# Patient Record
Sex: FEMALE | Race: WHITE | NOT HISPANIC OR LATINO | Employment: UNEMPLOYED | URBAN - METROPOLITAN AREA
[De-identification: names, ages, dates, MRNs, and addresses within clinical notes are randomized per-mention and may not be internally consistent; named-entity substitution may affect disease eponyms.]

---

## 2019-10-09 ENCOUNTER — HOSPITAL ENCOUNTER (OUTPATIENT)
Dept: URGENT CARE | Facility: CLINIC | Age: 19
Discharge: HOME | End: 2019-10-09
Attending: FAMILY MEDICINE

## 2019-10-17 ENCOUNTER — OFFICE VISIT CONVERTED (OUTPATIENT)
Dept: ORTHOPEDIC SURGERY | Facility: CLINIC | Age: 19
End: 2019-10-17
Attending: ORTHOPAEDIC SURGERY

## 2020-10-14 ENCOUNTER — HOSPITAL ENCOUNTER (OUTPATIENT)
Dept: OTHER | Facility: HOSPITAL | Age: 20
Discharge: HOME | End: 2020-10-14
Attending: NURSE PRACTITIONER

## 2020-10-16 LAB
CONV MUMPS ANTIBODY IGG: 34.6 AU/ML
HBV SURFACE AB SER QL: NON REACTIVE
MEV IGG SER IA-ACNC: 88.8 AU/ML
RUBV IGG SER-ACNC: 173.9 [IU]/ML
VZV IGG SER IA-ACNC: 271 INDEX

## 2021-02-04 ENCOUNTER — OFFICE VISIT CONVERTED (OUTPATIENT)
Dept: GASTROENTEROLOGY | Facility: HOSPITAL | Age: 21
End: 2021-02-04
Attending: FAMILY MEDICINE

## 2021-02-08 ENCOUNTER — HOSPITAL ENCOUNTER (OUTPATIENT)
Dept: CARDIOLOGY | Facility: HOSPITAL | Age: 21
Discharge: HOME | End: 2021-02-08
Attending: SURGERY

## 2021-02-11 ENCOUNTER — HOSPITAL ENCOUNTER (OUTPATIENT)
Dept: PREADMISSION TESTING | Facility: HOSPITAL | Age: 21
Discharge: HOME | End: 2021-02-11
Attending: SURGERY

## 2021-02-11 LAB
ANION GAP SERPL CALC-SCNC: 11 MMOL/L (ref 8–19)
APTT BLD: 20.9 S (ref 22.2–34.2)
BASOPHILS # BLD AUTO: 0.01 10*3/UL (ref 0–0.2)
BASOPHILS NFR BLD AUTO: 0.2 % (ref 0–3)
BUN SERPL-MCNC: 12 MG/DL (ref 5–25)
BUN/CREAT SERPL: 29 {RATIO} (ref 6–20)
CALCIUM SERPL-MCNC: 8.4 MG/DL (ref 8.7–10.4)
CHLORIDE SERPL-SCNC: 104 MMOL/L (ref 99–111)
CONV ABS IMM GRAN: 0.09 10*3/UL (ref 0–0.2)
CONV CO2: 24 MMOL/L (ref 22–32)
CONV IMMATURE GRAN: 1.4 % (ref 0–1.8)
CREAT UR-MCNC: 0.41 MG/DL (ref 0.5–0.9)
DEPRECATED RDW RBC AUTO: 55.4 FL (ref 36.4–46.3)
EOSINOPHIL # BLD AUTO: 0.02 10*3/UL (ref 0–0.7)
EOSINOPHIL # BLD AUTO: 0.3 % (ref 0–7)
ERYTHROCYTE [DISTWIDTH] IN BLOOD BY AUTOMATED COUNT: 16.8 % (ref 11.7–14.4)
GFR SERPLBLD BASED ON 1.73 SQ M-ARVRAT: >60 ML/MIN/{1.73_M2}
GLUCOSE SERPL-MCNC: 108 MG/DL (ref 65–99)
HCG UR QL: NEGATIVE
HCT VFR BLD AUTO: 35.4 % (ref 37–47)
HGB BLD-MCNC: 10.8 G/DL (ref 12–16)
INR PPP: 0.86 (ref 2–3)
LYMPHOCYTES # BLD AUTO: 0.48 10*3/UL (ref 1–5)
LYMPHOCYTES NFR BLD AUTO: 7.3 % (ref 20–45)
MCH RBC QN AUTO: 27.6 PG (ref 27–31)
MCHC RBC AUTO-ENTMCNC: 30.5 G/DL (ref 33–37)
MCV RBC AUTO: 90.3 FL (ref 81–99)
MONOCYTES # BLD AUTO: 0.15 10*3/UL (ref 0.2–1.2)
MONOCYTES NFR BLD AUTO: 2.3 % (ref 3–10)
NEUTROPHILS # BLD AUTO: 5.85 10*3/UL (ref 2–8)
NEUTROPHILS NFR BLD AUTO: 88.5 % (ref 30–85)
NRBC CBCN: 0 % (ref 0–0.7)
OSMOLALITY SERPL CALC.SUM OF ELEC: 280 MOSM/KG (ref 273–304)
PLATELET # BLD AUTO: 275 10*3/UL (ref 130–400)
PMV BLD AUTO: 10.1 FL (ref 9.4–12.3)
POTASSIUM SERPL-SCNC: 4.4 MMOL/L (ref 3.5–5.3)
PROTHROMBIN TIME: 9.7 S (ref 9.4–12)
RBC # BLD AUTO: 3.92 10*6/UL (ref 4.2–5.4)
SODIUM SERPL-SCNC: 135 MMOL/L (ref 135–147)
WBC # BLD AUTO: 6.6 10*3/UL (ref 4.8–10.8)

## 2021-02-12 LAB — SARS-COV-2 RNA SPEC QL NAA+PROBE: NOT DETECTED

## 2021-02-22 ENCOUNTER — HOSPITAL ENCOUNTER (OUTPATIENT)
Dept: OTHER | Facility: HOSPITAL | Age: 21
Discharge: HOME | End: 2021-02-22

## 2021-02-22 LAB
25(OH)D3 SERPL-MCNC: 32 NG/ML (ref 30–100)
ALBUMIN SERPL-MCNC: 3.2 G/DL (ref 3.5–5)
ALBUMIN/GLOB SERPL: 1 {RATIO} (ref 1.4–2.6)
ALP SERPL-CCNC: 63 U/L (ref 42–98)
ALT SERPL-CCNC: 14 U/L (ref 10–40)
ANION GAP SERPL CALC-SCNC: 13 MMOL/L (ref 8–19)
AST SERPL-CCNC: 14 U/L (ref 15–50)
BASOPHILS # BLD AUTO: 0.03 10*3/UL (ref 0–0.2)
BASOPHILS NFR BLD AUTO: 0.6 % (ref 0–3)
BILIRUB SERPL-MCNC: <0.15 MG/DL (ref 0.2–1.3)
BUN SERPL-MCNC: 13 MG/DL (ref 5–25)
BUN/CREAT SERPL: 26 {RATIO} (ref 6–20)
CALCIUM SERPL-MCNC: 9.4 MG/DL (ref 8.7–10.4)
CHLORIDE SERPL-SCNC: 107 MMOL/L (ref 99–111)
CONV ABS IMM GRAN: 0.06 10*3/UL (ref 0–0.2)
CONV CO2: 26 MMOL/L (ref 22–32)
CONV IMMATURE GRAN: 1.3 % (ref 0–1.8)
CONV TOTAL PROTEIN: 6.5 G/DL (ref 6.3–8.2)
CREAT UR-MCNC: 0.5 MG/DL (ref 0.5–0.9)
DEPRECATED RDW RBC AUTO: 51.6 FL (ref 36.4–46.3)
EOSINOPHIL # BLD AUTO: 0.07 10*3/UL (ref 0–0.7)
EOSINOPHIL # BLD AUTO: 1.5 % (ref 0–7)
ERYTHROCYTE [DISTWIDTH] IN BLOOD BY AUTOMATED COUNT: 15.8 % (ref 11.7–14.4)
GFR SERPLBLD BASED ON 1.73 SQ M-ARVRAT: >60 ML/MIN/{1.73_M2}
GLOBULIN UR ELPH-MCNC: 3.3 G/DL (ref 2–3.5)
GLUCOSE SERPL-MCNC: 71 MG/DL (ref 65–99)
HCT VFR BLD AUTO: 34.5 % (ref 37–47)
HGB BLD-MCNC: 10.4 G/DL (ref 12–16)
LYMPHOCYTES # BLD AUTO: 1.59 10*3/UL (ref 1–5)
LYMPHOCYTES NFR BLD AUTO: 34.3 % (ref 20–45)
MCH RBC QN AUTO: 26.7 PG (ref 27–31)
MCHC RBC AUTO-ENTMCNC: 30.1 G/DL (ref 33–37)
MCV RBC AUTO: 88.7 FL (ref 81–99)
MONOCYTES # BLD AUTO: 0.45 10*3/UL (ref 0.2–1.2)
MONOCYTES NFR BLD AUTO: 9.7 % (ref 3–10)
NEUTROPHILS # BLD AUTO: 2.43 10*3/UL (ref 2–8)
NEUTROPHILS NFR BLD AUTO: 52.6 % (ref 30–85)
NRBC CBCN: 0 % (ref 0–0.7)
OSMOLALITY SERPL CALC.SUM OF ELEC: 293 MOSM/KG (ref 273–304)
PLATELET # BLD AUTO: 326 10*3/UL (ref 130–400)
PMV BLD AUTO: 10.7 FL (ref 9.4–12.3)
POTASSIUM SERPL-SCNC: 4 MMOL/L (ref 3.5–5.3)
RBC # BLD AUTO: 3.89 10*6/UL (ref 4.2–5.4)
SODIUM SERPL-SCNC: 142 MMOL/L (ref 135–147)
WBC # BLD AUTO: 4.63 10*3/UL (ref 4.8–10.8)

## 2021-03-08 ENCOUNTER — HOSPITAL ENCOUNTER (OUTPATIENT)
Dept: CARDIOLOGY | Facility: HOSPITAL | Age: 21
Discharge: HOME | End: 2021-03-08
Attending: SURGERY

## 2021-03-15 ENCOUNTER — HOSPITAL ENCOUNTER (OUTPATIENT)
Dept: CARDIOLOGY | Facility: HOSPITAL | Age: 21
Discharge: HOME | End: 2021-03-15
Attending: FAMILY MEDICINE

## 2021-03-18 ENCOUNTER — HOSPITAL ENCOUNTER (OUTPATIENT)
Dept: PREADMISSION TESTING | Facility: HOSPITAL | Age: 21
Discharge: HOME | End: 2021-03-18
Attending: SURGERY

## 2021-03-22 LAB — SARS-COV-2 RNA SPEC QL NAA+PROBE: NOT DETECTED

## 2021-03-23 ENCOUNTER — HOSPITAL ENCOUNTER (OUTPATIENT)
Dept: PERIOP | Facility: HOSPITAL | Age: 21
Setting detail: HOSPITAL OUTPATIENT SURGERY
Discharge: HOME | End: 2021-03-24
Attending: SURGERY

## 2021-03-23 LAB
ANION GAP SERPL CALC-SCNC: 13 MMOL/L (ref 8–19)
APTT BLD: 20.8 S (ref 22.2–34.2)
BASOPHILS # BLD AUTO: 0.01 10*3/UL (ref 0–0.2)
BASOPHILS NFR BLD AUTO: 0.2 % (ref 0–3)
BUN SERPL-MCNC: 12 MG/DL (ref 5–25)
BUN/CREAT SERPL: 23 {RATIO} (ref 6–20)
CALCIUM SERPL-MCNC: 9 MG/DL (ref 8.7–10.4)
CHLORIDE SERPL-SCNC: 108 MMOL/L (ref 99–111)
CONV ABS IMM GRAN: 0.05 10*3/UL (ref 0–0.2)
CONV CO2: 21 MMOL/L (ref 22–32)
CONV IMMATURE GRAN: 1.2 % (ref 0–1.8)
CREAT UR-MCNC: 0.53 MG/DL (ref 0.5–0.9)
DEPRECATED RDW RBC AUTO: 49.1 FL (ref 36.4–46.3)
EOSINOPHIL # BLD AUTO: 0.15 10*3/UL (ref 0–0.7)
EOSINOPHIL # BLD AUTO: 3.7 % (ref 0–7)
ERYTHROCYTE [DISTWIDTH] IN BLOOD BY AUTOMATED COUNT: 15.4 % (ref 11.7–14.4)
GFR SERPLBLD BASED ON 1.73 SQ M-ARVRAT: >60 ML/MIN/{1.73_M2}
GLUCOSE BLD-MCNC: 122 MG/DL (ref 65–99)
GLUCOSE BLD-MCNC: 143 MG/DL (ref 65–99)
GLUCOSE SERPL-MCNC: 73 MG/DL (ref 65–99)
HCG UR QL: NEGATIVE
HCT VFR BLD AUTO: 38.6 % (ref 37–47)
HGB BLD-MCNC: 11.5 G/DL (ref 12–16)
INR PPP: 0.9 (ref 2–3)
LYMPHOCYTES # BLD AUTO: 1.02 10*3/UL (ref 1–5)
LYMPHOCYTES NFR BLD AUTO: 24.9 % (ref 20–45)
MCH RBC QN AUTO: 25.8 PG (ref 27–31)
MCHC RBC AUTO-ENTMCNC: 29.8 G/DL (ref 33–37)
MCV RBC AUTO: 86.5 FL (ref 81–99)
MONOCYTES # BLD AUTO: 0.37 10*3/UL (ref 0.2–1.2)
MONOCYTES NFR BLD AUTO: 9 % (ref 3–10)
NEUTROPHILS # BLD AUTO: 2.5 10*3/UL (ref 2–8)
NEUTROPHILS NFR BLD AUTO: 61 % (ref 30–85)
NRBC CBCN: 0 % (ref 0–0.7)
OSMOLALITY SERPL CALC.SUM OF ELEC: 284 MOSM/KG (ref 273–304)
PLATELET # BLD AUTO: 235 10*3/UL (ref 130–400)
PMV BLD AUTO: 10.8 FL (ref 9.4–12.3)
POTASSIUM SERPL-SCNC: 4.4 MMOL/L (ref 3.5–5.3)
PROTHROMBIN TIME: 10 S (ref 9.4–12)
RBC # BLD AUTO: 4.46 10*6/UL (ref 4.2–5.4)
SODIUM SERPL-SCNC: 138 MMOL/L (ref 135–147)
WBC # BLD AUTO: 4.1 10*3/UL (ref 4.8–10.8)

## 2021-03-24 LAB
ANION GAP SERPL CALC-SCNC: 14 MMOL/L (ref 8–19)
BASOPHILS # BLD AUTO: 0.01 10*3/UL (ref 0–0.2)
BASOPHILS NFR BLD AUTO: 0.1 % (ref 0–3)
BUN SERPL-MCNC: 11 MG/DL (ref 5–25)
BUN/CREAT SERPL: 20 {RATIO} (ref 6–20)
CALCIUM SERPL-MCNC: 8.6 MG/DL (ref 8.7–10.4)
CHLORIDE SERPL-SCNC: 106 MMOL/L (ref 99–111)
CONV ABS IMM GRAN: 0.04 10*3/UL (ref 0–0.2)
CONV CO2: 23 MMOL/L (ref 22–32)
CONV IMMATURE GRAN: 0.3 % (ref 0–1.8)
CREAT UR-MCNC: 0.54 MG/DL (ref 0.5–0.9)
DEPRECATED RDW RBC AUTO: 46.9 FL (ref 36.4–46.3)
EOSINOPHIL # BLD AUTO: 0.06 10*3/UL (ref 0–0.7)
EOSINOPHIL # BLD AUTO: 0.5 % (ref 0–7)
ERYTHROCYTE [DISTWIDTH] IN BLOOD BY AUTOMATED COUNT: 15.3 % (ref 11.7–14.4)
GFR SERPLBLD BASED ON 1.73 SQ M-ARVRAT: >60 ML/MIN/{1.73_M2}
GLUCOSE BLD-MCNC: 84 MG/DL (ref 65–99)
GLUCOSE SERPL-MCNC: 108 MG/DL (ref 65–99)
HCT VFR BLD AUTO: 34.6 % (ref 37–47)
HGB BLD-MCNC: 10.6 G/DL (ref 12–16)
LYMPHOCYTES # BLD AUTO: 0.91 10*3/UL (ref 1–5)
LYMPHOCYTES NFR BLD AUTO: 7.8 % (ref 20–45)
MCH RBC QN AUTO: 25.9 PG (ref 27–31)
MCHC RBC AUTO-ENTMCNC: 30.6 G/DL (ref 33–37)
MCV RBC AUTO: 84.4 FL (ref 81–99)
MONOCYTES # BLD AUTO: 0.27 10*3/UL (ref 0.2–1.2)
MONOCYTES NFR BLD AUTO: 2.3 % (ref 3–10)
NEUTROPHILS # BLD AUTO: 10.41 10*3/UL (ref 2–8)
NEUTROPHILS NFR BLD AUTO: 89 % (ref 30–85)
NRBC CBCN: 0 % (ref 0–0.7)
OSMOLALITY SERPL CALC.SUM OF ELEC: 288 MOSM/KG (ref 273–304)
PLATELET # BLD AUTO: 255 10*3/UL (ref 130–400)
PMV BLD AUTO: 11.6 FL (ref 9.4–12.3)
POTASSIUM SERPL-SCNC: 3.8 MMOL/L (ref 3.5–5.3)
RBC # BLD AUTO: 4.1 10*6/UL (ref 4.2–5.4)
SODIUM SERPL-SCNC: 139 MMOL/L (ref 135–147)
WBC # BLD AUTO: 11.7 10*3/UL (ref 4.8–10.8)

## 2021-04-26 ENCOUNTER — HOSPITAL ENCOUNTER (OUTPATIENT)
Dept: CARDIOLOGY | Facility: HOSPITAL | Age: 21
Discharge: HOME | End: 2021-04-26
Attending: SURGERY

## 2021-05-03 ENCOUNTER — HOSPITAL ENCOUNTER (OUTPATIENT)
Dept: VACCINE CLINIC | Facility: HOSPITAL | Age: 21
Discharge: HOME | End: 2021-05-03
Attending: INTERNAL MEDICINE

## 2021-05-10 ENCOUNTER — HOSPITAL ENCOUNTER (OUTPATIENT)
Dept: CARDIOLOGY | Facility: HOSPITAL | Age: 21
Discharge: HOME | End: 2021-05-10
Attending: SURGERY

## 2021-05-15 VITALS — HEART RATE: 89 BPM | WEIGHT: 197 LBS | OXYGEN SATURATION: 98 % | HEIGHT: 63 IN | BODY MASS INDEX: 34.91 KG/M2

## 2021-05-25 ENCOUNTER — HOSPITAL ENCOUNTER (OUTPATIENT)
Dept: VACCINE CLINIC | Facility: HOSPITAL | Age: 21
Discharge: HOME | End: 2021-05-25
Attending: INTERNAL MEDICINE

## 2021-05-28 VITALS
OXYGEN SATURATION: 100 % | WEIGHT: 147.93 LBS | HEART RATE: 116 BPM | BODY MASS INDEX: 26.21 KG/M2 | HEIGHT: 63 IN | RESPIRATION RATE: 16 BRPM | TEMPERATURE: 97.6 F | DIASTOLIC BLOOD PRESSURE: 60 MMHG | SYSTOLIC BLOOD PRESSURE: 150 MMHG

## 2021-05-28 NOTE — PROGRESS NOTES
"Patient: MARY BAUTISTA     Acct: [id number]     Report: #[id number]  UNIT #: [id number]     : 2000    Encounter Date:2021  PRIMARY CARE: CHRISTOPHER ANNE  ***Signed***  --------------------------------------------------------------------------------------------------------------------  Chief Complaint      Encounter Date      2021            Primary Care Provider      CHRISTOPHER ANNE            Referring Provider            [facility]            Patient Complaint      Patient is complaining of      Pt here for  2 week follow up/ Acute hypoxic respiratory failure requiring     intubation and mechanical ventilation      Assessment from Discharge note:      \"ARDS      Pneumomediastinum      COVID-19 ruled out      Bilateral apical pneumothoraces      Community-acquired pneumonia, unknown organism      Septic shock due to above (Hypotension, Tachycardia, Leukocytosis, Tachypnea,     Source)      Raynaud's      Lactic acidosis      Acute Renal Failure      Previous tobacco abuse      Hypomagnesemia      Bilateral lower extremity ischemic changes- bilateral toe necrosis       Hypokalemia      Concern for toxic shock syndrome\"            VITALS      Height 5 ft 3 in / 160.02 cm      Weight 147 lbs 14.859 oz / 67.1 kg      BSA 1.70 m2      BMI 26.2 kg/m2      Temperature 97.6 F / 36.44 C - Oral      Pulse 116      Respirations 16      Blood Pressure 150/60 Sitting, Right Arm      Pulse Oximetry 100%, room air            HPI      Follow up at [facility] for post hospital discharge follow up.            20 year old female ex-smoker with no past medical history up until her     hospitalization from 2021 to 2021 for Acute hypoxic respiratory     failure requiring intubation and mechanical ventilation, ARDS,     Pneumomediastinum, Bilateral apical pneumothoraces, Community-acquired     pneumonia, unknown organism, Septic shock, Raynaud's, renal failure, Bilateral     lower " extremity ischemic changes- bilateral toe necrosis. She was ruled out for     COVID19. There was concern for EVALI with her vaping history, vs CTD such as     scleroderma, or possibly CHD or PVOD. She was discharged home in stable     condition.             Post discharge she has not required NC O2.            Complains of weakness, requiring a wheelchair and walker to mobilize.             Accompanied by her mother; mother concerned about discoloration of her right     lower leg; open ulcer area on the medial aspect by her heel and her toes have     become black and gangrenous, there are new areas of superficial necrosis atop     her foot. She is applying bacitracin to the ulcer area and topical nitropaste to    the gangrenous region. She complains of pain and discomfort, there is no warmth     or redness. She has a vascular appointment but not for several weeks. She asks     we help with her moving this appointment up.       Continues to have right sided chest pain, tolerable. She continues to have cough    intermittently, she has no dysphagia symptoms. She has been using an albuterol     inhaler twice a day. She was not sure to use if as needed.            She did follow up with Rheumatology post discharge on 1/27/2021; High titer     positive RICHARD, gangrene of feet, raynaud's disease with gangrene, she will follow    up with vascular surgery Dr. Adams and continue taking Prednisone 20 mg daily,     start azathioprine 50 mg daily, follow up in 1 month. She has had a superficial     rash develop on her chest, arms and back, she states it does not bother her and     can not determine if it coinsides with starting Azathioprine. Denies pruritis.             She denied any new fevers, chills, sweats, headache, dizziness, lightheadedness,    chest pain, palpitations, abdominal pain, nausea, vomiting, diarrhea.            ROS      Constitutional:  Denies: Fatigue, Fever, Weight gain, Weight loss, Chills,     Insomnia,  Other      Respiratory/Breathing:  Complains of: Cough; Denies: Shortness of air, Wheezing,    Hemoptysis, Pleuritic pain, Other      Endocrine:  Denies: Polydipsia, Polyuria, Heat/cold intolerance, Abnorml     menstrual pattern, Diabetes, Other      Eyes:  Denies: Blurred vision, Vision Changes, Other      Ears, nose, mouth, throat:  Complains of: Allergies/Hay Fever, Dry Mouth, Nasal     congestion; Denies: Mouth lesions, Thrush, Throat pain, Hoarseness, Post Nasal     Drip, Headaches, Recent Head Injury, Nose Bleeding, Neck Stiffness, Thyroid     Mass, Hearing Loss, Ear Fullness, Nasal or Sinus Pain, Dry Lips, Nasal     discharge, Other      Cardiovascular:  Complains of: Chest Pain, Leg Swelling (jewell feet and ankles),     Irregular Heart Rate, Dyspnea on Exertion; Denies: Palpitations, Syncope,     Claudication, Wake up Gasping for air, Cyanosis, Other      Gastrointestinal:  Complains of: Reflux/Heartburn; Denies: Nausea, Constipation,    Diarrhea, Abdominal pain, Vomiting, Difficulty Swallowing, Dysphagia, Jaundice,     Bloating, Melena, Bloody stools, Other      Genitourinary:  Denies: Urinary frequency, Incontinence, Hematuria, Urgency,     Nocturia, Dysuria, Testicular problems, Other      Musculoskeletal:  Denies: Joint Pain, Joint Stiffness, Joint Swelling, Myalgias,    Other      Hematologic/lymphatic:  DENIES: Lymphadenopathy, Bruising, Bleeding tendencies,     Other      Neurological:  Complains of: Numbness (Biol Feet), Weakness; Denies: Headache,     Seizures, Other      Psychiatric:  Complains of: Anxiety, Depression; Denies: Appropriate Effect,     Other      Sleep:  No: Excessive daytime sleep, Morning Headache?, Snoring, Insomnia?, Stop    breathing at sleep?, Other      Integumentary:  Complains of: Rash; Denies: Dry skin, Skin Warm to Touch, Other      Immunologic/Allergic:  Complains of: Seasonal allergies; Denies: Latex allergy,     Asthma, Urticaria, Eczema, Other      Immunization status:   Up to date            FAMILY/SOCIAL/MEDICAL HX      Surgical History:  Yes: Oral Surgery (WISDOM TEETH REMOVAL)      Stroke - Family Hx:  Grandparent      Heart - Family Hx:  Grandparent      Diabetes - Family Hx:  Grandparent      Cancer/Type - Family Hx:  Grandparent      Other Family Medical History:  Grandparent      Is Father Still Living?:  Yes      Is Mother Still Living?:  Yes      Social History:  Tobacco Use; No Alcohol Use, No Recreational Drug use      Smoking status:  Current every day smoker (Pt started smoking at 17 years old, 1    pack per week/ quit Dec 2020)       Section:  No      Tubal Ligation:  No      Hysterectomy:  No      Anticoagulation Therapy:  No      Antibiotic Prophylaxis:  No      Medical History:  Yes: Allergies, Depression, Anxiety, Reflux Disease, Shortness    Of Breath, Sinus Trouble; No: Arthritis, Asthma, Blood Disease,     Chemotherapy/Cancer, Chronic Bronchitis/COPD, Congestive Heart Failu, Deafness     or Ringing Ears, Diabetes, Seizures, Heart Attack, Hemorrhoids/Rectal Prob, High    Blood Pressure, Miscellaneous Medical/oth      Psychiatric History      Depression/Anxiety            PREVENTION      Hx Influenza Vaccination:  Yes      Influenza Vaccine Declined:  No      2 or More Falls in Past Year?:  No      Fall Past Year with Injury?:  No      Hx Pneumococcal Vaccination:  No      Encouraged to follow-up with:  PCP regarding preventative exams.      Chart initiated by      Isabella Varner MA, Cindy Black MD            ALLERGIES/MEDICATIONS      Allergies:        Coded Allergies:             AMOXICILLIN (Verified  Allergy, Intermediate, HORRIBLE BODY RASH, 21)           PENICILLINS (Verified  Allergy, Unknown, 21)                  HORRIBLE BODY RASH      Medications      azaTHIOprine (Imuran) 50 Mg Tab      50 MG PO QDAY, TAB         Reported         21       predniSONE (predniSONE) 20 Mg Tablet      20 MG PO QDAY for 5 Days, #5 TAB 0 Refills          Prov: Brian Marti         1/26/21       hydrOXYzine HCL (Atarax) 25 Mg Tablet      25 MG PO HS PRN for SLEEP for 30 Days, #30 TAB         Prov: Brian Marti         1/26/21       Aspirin EC (Aspirin EC) 81 Mg Tablet.dr      81 MG PO QDAY for 30 Days, #30 TAB.SR         Prov: Brian Marti         1/26/21       Nitroglycerin (Nitro-Bid) 60 Gm Oint...g.      1 INCH TOPICAL Q6HR, #1 TUBE 1 Refill         Prov: Brian Marti         1/26/21       amLODIPine (amLODIPine) 5 Mg Tablet      5 MG PO BID for 30 Days, #60 TAB         Prov: Brian Marti         1/26/21       Ondansetron Odt (ONDANSETRON ODT) 4 Mg Tab.rapdis      4 MG PO Q6H PRN for NAUSEA         Reported         1/13/21       MDI-Albuterol (Ventolin HFA) 8 Gm Hfa.aer.ad      2 PUFFS INH Q4H PRN for SHORTNESS OF BREATH, #1 MDI 0 Refills         Reported         1/13/21      Current Medications      Current Medications Reviewed 2/4/21            EXAM      Vital Signs reviewed.      GENERAL: Awake, alert, cooperative, pleasant, sitting in room recliner chair      HEENT: EOMI, Oropharynx and nasal mucosa normal, no lesions orally      NECK: No adenopathy, FROM      CARDIOVASCULAR: Heart regular rate and rhythm, no murmurs detected.       LUNGS: Bilateral breath sounds are noted. No wheezing.      ABDOMEN: Symmetrical without distension. BS are present in all four quadrants.      EXTREMITIES: Peripheral pulses are positive. No edema is noted, dry gangrene of     toes on right foot, necrotic areas affecting bilateral toes and the plantar     aspect of the right foot. Superficial ulceration right foot medial aspect above     heel, no drainage or purulence.      NEUROLOGIC: Mental status and alertness is normal.      SKIN: Maculopapular rash blanching involving arms bilaterally, as well torso.      PSYCHIATRIC: Mood and affect appear normal      Vtials      Vitals:             Height 5 ft 3 in / 160.02 cm           Weight 147 lbs 14.859 oz / 67.1 kg           BSA 1.70  m2           BMI 26.2 kg/m2           Temperature 97.6 F / 36.44 C - Oral           Pulse 116           Respirations 16           Blood Pressure 150/60 Sitting, Right Arm           Pulse Oximetry 100%, room air            REVIEW      Results Reviewed      PCCS Results Reviewed?:  Yes Prev Lab Results, Yes Prev Radiology Results, Yes     Previous [facility] Records            Assessment      Notes      New Medications      * azaTHIOprine (Imuran) 50 MG TAB: 50 MG PO QDAY      Discontinued Medications      * Melatonin 5 MG TABLET: 5 MG PO QDAY@20 30 Days #30      New Diagnostics      * Chest W/O Cont CT, SCHEDULED PROCEDURE         Dx: Pneumomediastinum - J98.2      * Echo Complete, Week         Dx: Pneumomediastinum - J98.2      Assessment:      Hospital discharge follow up      Recent ARDS      Pulmonary infiltrates      Possible EVALI, Vs connective tissue disease (scleroderma) vs ?  Possible     congenital heart disease versus PVOD      Maculopapular rash      Gangrenous toes involving the right foot      Peripheral vascular disease with ulceration of skin involving right foot      Generalized weakness      Physical deconditioning            Plan:      Reviewed labs, images, previous documentation in EMR      Advised to continue with bacitracin to open ulcer area of right foot, moved her     Vascular surgery follow up appointment up to 2/8/2021 at 9:15 am. Advised of     worsening gangrenous signs of toes, dry. More than likely she is as risk of limb    loss in the future.       Discussed follow up with rheumatology as scheduled.       Watch for worsening rash development, it does not appear toxic, there is no     mucosal involvement, advised to watch for worsening rash symptoms, if rash     further develops or worsens, to stop azathioprine and contact MD office     immediately.      Encouraged oral intake to increase protein.      Reviewed appropriate use of Albuterol inhaler as rescue inhaler.       Discussed with  Dr. Ortiz, will follow up with her in 3 weeks, interm she will     have done a CT chest and Echo scheduled.       Home health to continue for therapy services.      Counselled patient and mother when to contact MD, red flag signs when to come to    the ER.      To continue care and follow up with PCP.            Total time spent for medical decision making 46 minutes, including time for     review of EMR, records, images, labs, visit time, counselling, answering all     questions for patient and mother.            Patient Education      Patient Education Provided:  How to use an Inhaler      Time Spent:  Minutes (4)            Electronically signed by Cindy Black  02/04/2021 12:33       Disclaimer: Converted document may not contain table formatting or lab diagrams. Please see [employer] for the authenticated document.

## 2021-09-01 ENCOUNTER — OFFICE VISIT (OUTPATIENT)
Dept: VASCULAR SURGERY | Facility: HOSPITAL | Age: 21
End: 2021-09-01

## 2021-09-01 VITALS
RESPIRATION RATE: 18 BRPM | HEART RATE: 70 BPM | SYSTOLIC BLOOD PRESSURE: 120 MMHG | TEMPERATURE: 99 F | DIASTOLIC BLOOD PRESSURE: 66 MMHG

## 2021-09-01 DIAGNOSIS — Z89.511 STATUS POST BELOW-KNEE AMPUTATION OF RIGHT LOWER EXTREMITY (HCC): Primary | ICD-10-CM

## 2021-09-01 DIAGNOSIS — S98.112A AMPUTATION OF LEFT GREAT TOE (HCC): ICD-10-CM

## 2021-09-01 DIAGNOSIS — Z89.422 STATUS POST AMPUTATION OF LESSER TOE OF LEFT FOOT (HCC): ICD-10-CM

## 2021-09-01 PROCEDURE — G0463 HOSPITAL OUTPT CLINIC VISIT: HCPCS

## 2021-09-01 PROCEDURE — 99213 OFFICE O/P EST LOW 20 MIN: CPT | Performed by: SURGERY

## 2021-09-01 PROCEDURE — G0463 HOSPITAL OUTPT CLINIC VISIT: HCPCS | Performed by: SURGERY

## 2021-09-01 RX ORDER — AMLODIPINE BESYLATE 5 MG/1
5 TABLET ORAL DAILY
COMMUNITY
Start: 2021-07-20

## 2021-09-01 RX ORDER — HYDROXYCHLOROQUINE SULFATE 200 MG/1
200 TABLET, FILM COATED ORAL DAILY
COMMUNITY
Start: 2021-07-20

## 2021-09-01 RX ORDER — OMEPRAZOLE 20 MG/1
20 CAPSULE, DELAYED RELEASE ORAL DAILY
COMMUNITY

## 2021-09-01 RX ORDER — ASPIRIN 81 MG/1
81 TABLET, CHEWABLE ORAL DAILY
COMMUNITY

## 2021-09-01 NOTE — PROGRESS NOTES
[facility]   Follow up Office    Patient Name: Teri Wood  : 2000  MRN: [id number]  Primary Care Physician:  Zhane Sheffield APRN      Subjective   Subjective     HPI:    Teri Wood is a 21 y.o. female who became very ill and developed sepsis back in 2021.  She was admitted to the hospital very severely ill and on mechanical ventilation and pressors.  She ended up developing tissue necrosis of the lower extremities ultimately requiring right BKA and amputation of multiple left foot toes.  She has now already received her prosthetic and is ambulating.  She is doing well.  She had developed some discoloration and changes in her left foot which have been evaluated by dermatology and diagnosed as keloids.  She is undergoing treatment with steroid injections and scar dressings.      Objective     Vitals:   Temp:  [99 °F (37.2 °C)] 99 °F (37.2 °C)  Heart Rate:  [70] 70  Resp:  [18] 18  BP: (120)/(66) 120/66    Physical Exam      General: Alert, no acute distress.  Neck: Supple  Heart: Regular rate  Lungs: Clear  Abdomen: Benign  Extremities: Status post right BKA, status post multiple left foot toe amputations.    Assessment/Plan   Assessment / Plan     Diagnoses and all orders for this visit:    1. Status post below-knee amputation of right lower extremity (CMS/HCC) (Primary)    2. Status post amputation of lesser toe of left foot (CMS/HCC)    3. Amputation of left great toe (CMS/HCC)       Assessment/Plan:   Clemencia appears to be doing well from the vascular standpoint.  At this time the plan is for her to follow-up with us as needed.        Electronically signed by Juancarlos Adams MD, 21, 10:47 AM EDT.

## 2023-08-11 PROBLEM — R26.89 IMPAIRMENT OF BALANCE: Status: ACTIVE | Noted: 2021-07-20

## 2023-08-11 PROBLEM — S88.111A BELOW-KNEE AMPUTATION OF RIGHT LOWER EXTREMITY: Status: ACTIVE | Noted: 2021-03-10

## 2023-08-11 PROBLEM — Z74.09 IMPAIRED MOBILITY: Status: ACTIVE | Noted: 2021-07-20

## 2023-08-11 PROBLEM — R26.9 ABNORMAL GAIT: Status: ACTIVE | Noted: 2021-07-20

## 2023-08-11 PROBLEM — I73.00 RAYNAUD'S DISEASE: Status: ACTIVE | Noted: 2023-08-11

## 2023-08-11 PROBLEM — M32.9 SYSTEMIC LUPUS: Status: ACTIVE | Noted: 2021-01-27

## 2023-09-26 ENCOUNTER — OFFICE VISIT (OUTPATIENT)
Dept: BEHAVIORAL HEALTH | Facility: CLINIC | Age: 23
End: 2023-09-26
Payer: COMMERCIAL

## 2023-09-26 VITALS
HEIGHT: 64 IN | SYSTOLIC BLOOD PRESSURE: 124 MMHG | BODY MASS INDEX: 29.88 KG/M2 | DIASTOLIC BLOOD PRESSURE: 66 MMHG | HEART RATE: 88 BPM | WEIGHT: 175 LBS

## 2023-09-26 DIAGNOSIS — F12.90 MARIJUANA USE, CONTINUOUS: ICD-10-CM

## 2023-09-26 DIAGNOSIS — F43.10 POST TRAUMATIC STRESS DISORDER (PTSD): ICD-10-CM

## 2023-09-26 DIAGNOSIS — F41.0 PANIC DISORDER: ICD-10-CM

## 2023-09-26 DIAGNOSIS — F41.1 GENERALIZED ANXIETY DISORDER: ICD-10-CM

## 2023-09-26 DIAGNOSIS — F33.2 SEVERE EPISODE OF RECURRENT MAJOR DEPRESSIVE DISORDER, WITHOUT PSYCHOTIC FEATURES: Primary | ICD-10-CM

## 2023-09-26 RX ORDER — FLUOXETINE HYDROCHLORIDE 20 MG/1
20 CAPSULE ORAL EVERY MORNING
Qty: 30 CAPSULE | Refills: 1 | Status: SHIPPED | OUTPATIENT
Start: 2023-09-26 | End: 2023-10-26

## 2023-09-26 RX ORDER — FLUOXETINE HYDROCHLORIDE 40 MG/1
40 CAPSULE ORAL EVERY MORNING
Qty: 30 CAPSULE | Refills: 1 | Status: SHIPPED | OUTPATIENT
Start: 2023-09-26 | End: 2023-10-26

## 2023-09-26 NOTE — PROGRESS NOTES
Chief Complaint:  Depression, anxiety    History of Present Illness: Teri Wood is a 23 y.o. female who presents today referred by therapist Jenny Carvalho [facility]. Pt c/o depression that is constant and rates it a 8/10. She also c/o anhedonia and hopelessness. Pt admits to having passive SI that comes and goes, occurs a few times a week. No plan or intent. No access to firearms. No h/o suicide attempts. H/o self harm with cutting, which she last did >5 years ago. No urges to self harm. No difficulty sleeping. No symptoms of amy/hypomania. Pt c/o anxiety that is constant and rates it a 10/10 that has increased over the past few weeks. Her anxiety is increased with leaving her house. Pt is able to get groceries once a week without difficulty. Her anxiety is worse in social situations. Her anxiety consists of worrying about everything. H/o panic attack that consists of shaking, chest tightness, SOA, palpitations, no recent episodes. She also c/o on edge and easily irritable. No symptoms of OCD. Pt will have reoccurring intrusive thoughts about past trauma with trigger such as the doctor's office. No nightmares. Pt denies AVH. No restrictive eating, binge eating or purging.       Medical Record Review: Reviewed note from 8/9/23, pt diagnosed with STEF, MDD, PTSD. Pt continues to try to get out some. She agrees that getting out more consistently does help her, but she is still very anxious. Treatment objective plan is to process grief over the loss of her health and particularly her toes and leg; adjust to the changes and how they impact her life forward.       PHQ-9 Depression Screening  Little interest or pleasure in doing things? 1-->several days   Feeling down, depressed, or hopeless? 2-->more than half the days   Trouble falling or staying asleep, or sleeping too much? 0-->not at all   Feeling tired or having little energy? 1-->several days   Poor appetite or overeating? 1-->several days   Feeling bad  about yourself - or that you are a failure or have let yourself or your family down? 3-->nearly every day   Trouble concentrating on things, such as reading the newspaper or watching television? 3-->nearly every day   Moving or speaking so slowly that other people could have noticed? Or the opposite - being so fidgety or restless that you have been moving around a lot more than usual? 1-->several days   Thoughts that you would be better off dead, or of hurting yourself in some way? 0-->not at all   PHQ-9 Total Score 12   If you checked off any problems, how difficult have these problems made it for you to do your work, take care of things at home, or get along with other people? extremely difficult         STEF-7  Feeling nervous, anxious or on edge: Nearly every day  Not being able to stop or control worrying: More than half the days  Worrying too much about different things: Nearly every day  Trouble Relaxing: Nearly every day  Being so restless that it is hard to sit still: Several days  Feeling afraid as if something awful might happen: Several days  Becoming easily annoyed or irritable: More than half the days  STEF 7 Total Score: 15  If you checked any problems, how difficult have these problems made it for you to do your work, take care of things at home, or get along with other people: Extremely difficult      ROS:  Review of Systems   Constitutional:  Negative for appetite change, diaphoresis and unexpected weight change.   HENT:  Negative for drooling, tinnitus and trouble swallowing.    Eyes:  Negative for visual disturbance.   Respiratory:  Negative for cough, chest tightness and shortness of breath.    Cardiovascular:  Negative for chest pain and palpitations.   Gastrointestinal:  Negative for abdominal pain, constipation, diarrhea, nausea and vomiting.   Endocrine: Negative for cold intolerance and heat intolerance.   Genitourinary:  Negative for difficulty urinating.   Musculoskeletal:  Negative for  arthralgias and myalgias.   Skin:  Negative for rash.   Allergic/Immunologic: Negative for immunocompromised state.   Neurological:  Negative for dizziness, tremors, seizures and headaches.   Psychiatric/Behavioral:  Positive for agitation, dysphoric mood and suicidal ideas. Negative for hallucinations, self-injury and sleep disturbance. The patient is nervous/anxious.      Problem List:  Patient Active Problem List   Diagnosis    Abnormal gait    Allergic rhinitis    Asthma    Below-knee amputation of right lower extremity    Depression with anxiety    Impaired mobility    Impairment of balance    Raynaud's disease    Systemic lupus       Current Medications:   Current Outpatient Medications   Medication Sig Dispense Refill    amLODIPine (NORVASC) 5 MG tablet Take 1 tablet by mouth Daily.      aspirin 81 MG chewable tablet Chew 1 tablet Daily.      azithromycin (Zithromax Z-Juan Carlos) 250 MG tablet Take 2 tablets the first day, then 1 tablet daily for 4 days. 6 tablet 0    BIOTIN PO Take  by mouth.      cetirizine (zyrTEC) 10 MG tablet Take 1 tablet by mouth.      Cholecalciferol 25 MCG (1000 UT) tablet dispersible Take  by mouth.      cyanocobalamin (VITAMIN B-12) 1000 MCG tablet Take  by mouth.      hydroxychloroquine (PLAQUENIL) 200 MG tablet Take 1 tablet by mouth Daily.      multivitamin (THERAGRAN) tablet tablet Take  by mouth.      omeprazole (priLOSEC) 20 MG capsule Take 1 capsule by mouth Daily.      Saccharomyces boulardii (Probiotic) 250 MG capsule Take  by mouth.      FLUoxetine (PROzac) 20 MG capsule Take 1 capsule by mouth Every Morning for 30 days. Take with 40mg cap for total dose of 60mg 30 capsule 1    FLUoxetine (PROzac) 40 MG capsule Take 1 capsule by mouth Every Morning for 30 days. Take with 20mg cap for total dose of 60mg 30 capsule 1     No current facility-administered medications for this visit.       Discontinued Medications:  Medications Discontinued During This Encounter   Medication Reason     FLUoxetine (PROzac) 20 MG capsule        Allergy:   Allergies   Allergen Reactions    Amoxicillin Rash    Amoxicillin-Pot Clavulanate Rash     Mom unsure - was years ago    Azathioprine Rash    Penicillins Rash        Past Medical History:  Past Medical History:   Diagnosis Date    Anxiety     Hypertension     Lupus        Past Surgical History:  Past Surgical History:   Procedure Laterality Date    AMPUTATION Left        Past Psychiatric History:  Began Treatment: 8th grade   Diagnoses: Depression, anxiety, PTSD.   Psychiatrist: Pt last saw a psychiatrist in 8th grade.   Therapist: Pt has been seeing her therapist Jenny Carvalho [facility] since 8th grade.   Admission History: Pt was admitted once in 8th grade.   Medications/Treatment: Paxil (irritable), Prozac, Lexapro (was sick on this med)  Self Harm: H/o self harm with cutting, which she last did >5 years ago.   Suicide Attempts: Denies  Postpartum depression: Denies    Family Psychiatric History:   Diagnoses: Her mother has a h/o depression. Her mat grandmother with h/o depression.   Substance use: Her pat grandparents had a h/o EtOH abuse.   Suicide Attempts/Completions: Denies    Family History   Problem Relation Age of Onset    Depression Mother     No Known Problems Father     No Known Problems Sister     No Known Problems Brother     No Known Problems Maternal Aunt     No Known Problems Paternal Aunt     No Known Problems Maternal Uncle     No Known Problems Paternal Uncle     No Known Problems Maternal Grandfather     No Known Problems Maternal Grandmother     No Known Problems Paternal Grandfather     No Known Problems Paternal Grandmother     No Known Problems Cousin     No Known Problems Other     ADD / ADHD Neg Hx     Alcohol abuse Neg Hx     Anxiety disorder Neg Hx     Bipolar disorder Neg Hx     Dementia Neg Hx     Drug abuse Neg Hx     OCD Neg Hx     Paranoid behavior Neg Hx     Schizophrenia Neg Hx     Seizures Neg Hx     Self-Injurious Behavior  Neg  "Hx     Suicide Attempts Neg Hx        Substance Abuse History:   Alcohol use: Pt will drink once every 1-2 weeks.   Nicotine: Denies  Illicit Drug Use: Pt smokes marijuana daily.   Longest Period Sober: Denies  Rehab/AA/NA: Denies    Social History:  Living Situation: Pt lives with boyfriend.   Marital/Relationship History: 5 years with boyfriend. No abuse or trauma.   Children: Denies  Work History/Occupation: Denies  Education: Pt completed high school, some college.    History: Denies  Legal: Denies    Social History     Socioeconomic History    Marital status: Single   Tobacco Use    Smoking status: Former     Types: Cigarettes     Passive exposure: Never    Smokeless tobacco: Never   Vaping Use    Vaping Use: Former   Substance and Sexual Activity    Alcohol use: Yes     Comment: occ    Drug use: Yes     Types: Marijuana    Sexual activity: Yes     Partners: Male       Developmental History:   Place of birth: [facility]  Siblings: 2 half siblings.   Childhood: Unremarkable. No h/o abuse or trauma.       Physical Exam:  Physical Exam    Appearance: Well-groomed with adequate hygiene, appears to be of stated age. Casually and neatly dressed, maintains good eye contact.   Behavior: Appropriate, cooperative. No acute distress.  Motor: No abnormal movements, tics or tremors are noted. No psychomotor agitation or retardation.  Speech: Coherent, spontaneous, appropriate with normal rate, volume, rhythm, and tone. Normal reaction time to questions. No hyperverbal or pressured speech.   Mood: \"I'm okay\"  Affect: Patient appears depressed and is briefly tearful.  Patient appears anxious.  Thought content: Negative suicidal ideations, negative homicidal ideations. Patient denies any obsession, compulsion, or phobia. No evidence of delusions.  Perceptions: Negative auditory hallucinations, negative visual hallucinations. Pt does not appear to be actively responding to internal stimuli.   Thought process: " "Logical, goal-directed, coherent, and linear with no evidence of flight of ideas, looseness of associations, thought blocking, circumstantiality, or tangentiality.   Insight/Judgement: Fair/fair  Cognition: Alert and oriented to person, place, and date. Memory intact for recent and remote events. Attention and concentration intact.     Vital Signs:   /66   Pulse 88   Ht 162.6 cm (64.02\")   Wt 79.4 kg (175 lb)   BMI 30.02 kg/m²      Lab Results:   Admission on 12/29/2022, Discharged on 12/29/2022   Component Date Value Ref Range Status    SARS Antigen 12/29/2022 Not Detected   Final    Internal Control 12/29/2022 Passed   Final    Lot Number 12/29/2022 [id number]   Final    Expiration Date 12/29/2022 10,212,023   Final    Rapid Influenza A Ag 12/29/2022 Negative   Final    Rapid Influenza B Ag 12/29/2022 Negative   Final    Internal Control 12/29/2022 Passed   Final    Lot Number 12/29/2022 [id number]   Final    Expiration Date 12/29/2022 10,212,023   Final    Rapid Strep A Screen 12/29/2022 Negative   Final    Internal Control 12/29/2022 Passed   Final    Lot Number 12/29/2022 [id number]   Final    Expiration Date 12/29/2022 2,282,024   Final       EKG Results:  No orders to display       Imaging Results:  No Images in the past 120 days found..      Assessment & Plan   Diagnoses and all orders for this visit:    1. Severe episode of recurrent major depressive disorder, without psychotic features (Primary)  -     FLUoxetine (PROzac) 40 MG capsule; Take 1 capsule by mouth Every Morning for 30 days. Take with 20mg cap for total dose of 60mg  Dispense: 30 capsule; Refill: 1  -     FLUoxetine (PROzac) 20 MG capsule; Take 1 capsule by mouth Every Morning for 30 days. Take with 40mg cap for total dose of 60mg  Dispense: 30 capsule; Refill: 1  -     Ambulatory Referral to Psychotherapy    2. Generalized anxiety disorder  -     FLUoxetine (PROzac) 40 MG capsule; Take 1 capsule by mouth Every Morning for 30 days. Take with " 20mg cap for total dose of 60mg  Dispense: 30 capsule; Refill: 1  -     FLUoxetine (PROzac) 20 MG capsule; Take 1 capsule by mouth Every Morning for 30 days. Take with 40mg cap for total dose of 60mg  Dispense: 30 capsule; Refill: 1  -     Ambulatory Referral to Psychotherapy    3. Panic disorder  -     FLUoxetine (PROzac) 40 MG capsule; Take 1 capsule by mouth Every Morning for 30 days. Take with 20mg cap for total dose of 60mg  Dispense: 30 capsule; Refill: 1  -     FLUoxetine (PROzac) 20 MG capsule; Take 1 capsule by mouth Every Morning for 30 days. Take with 40mg cap for total dose of 60mg  Dispense: 30 capsule; Refill: 1  -     Ambulatory Referral to Psychotherapy    4. Post traumatic stress disorder (PTSD)  -     FLUoxetine (PROzac) 40 MG capsule; Take 1 capsule by mouth Every Morning for 30 days. Take with 20mg cap for total dose of 60mg  Dispense: 30 capsule; Refill: 1  -     FLUoxetine (PROzac) 20 MG capsule; Take 1 capsule by mouth Every Morning for 30 days. Take with 40mg cap for total dose of 60mg  Dispense: 30 capsule; Refill: 1  -     Ambulatory Referral to Psychotherapy    5. Marijuana use, continuous      Patient screened positive for depression based on a PHQ-9 score of  on . Follow-up recommendations include: Prescribed antidepressant medication treatment, Referral to Mental Health specialist, Suicide Risk Assessment performed, and see assessment below .    Presentation seems most consistent with MDD, STEF, panic disorder, PTSD, marijuana use.  We will increase Prozac for management of depression, anxiety, and overall mood.  Consider effexor at future visit if appropriate. Patient declines medication as needed for acute anxiety.  Counseled on the need to decrease marijuana use.  We will refer for psychotherapy.  Follow-up in 1 month.  Addressed all questions and concerns.    Visit Diagnoses:    ICD-10-CM ICD-9-CM   1. Severe episode of recurrent major depressive disorder, without psychotic features   F33.2 296.33   2. Generalized anxiety disorder  F41.1 300.02   3. Panic disorder  F41.0 300.01   4. Post traumatic stress disorder (PTSD)  F43.10 309.81   5. Marijuana use, continuous  F12.90 305.21       PLAN:  Safety: No acute safety concerns at this time.  Therapy: We will refer for psychotherapy to Nellie bradley in counseling  Risk Assessment: Risk of self-harm acutely is moderate to severe.  Risk factors include anxiety disorder, mood disorder, intermittent SI (passive, no plan or intent, no present SI), h/o self harm in the past, AODA use, and recent psychosocial stressors (pandemic). Protective factors include no family history, denies access to guns/weapons, no history of suicide attempts in the past, healthcare seeking, future orientation, willingness to engage in care.  Risk of self-harm chronically is also moderate to severe, but could be further elevated in the event of treatment noncompliance and/or AODA.  Labs/Diagnostics Ordered:   Orders Placed This Encounter   Procedures    Ambulatory Referral to Psychotherapy     Medications:   New Medications Ordered This Visit   Medications    FLUoxetine (PROzac) 40 MG capsule     Sig: Take 1 capsule by mouth Every Morning for 30 days. Take with 20mg cap for total dose of 60mg     Dispense:  30 capsule     Refill:  1    FLUoxetine (PROzac) 20 MG capsule     Sig: Take 1 capsule by mouth Every Morning for 30 days. Take with 40mg cap for total dose of 60mg     Dispense:  30 capsule     Refill:  1       Discussed all risks, benefits, alternatives, and side effects of Fluoxetine including but not limited to GI upset, decreased appetite, sexual dysfunction, bleeding risk, seizure risk, insomnia, anxiety, drowsiness, headache, asthenia, tremor, nervousness, activation of amy or hypomania, increased fragility fracture risk, hyponatremia, ocular effects, withdrawal syndrome following abrupt discontinuation, serotonin syndrome, hypersensitivity reaction, and  activation of suicidal ideation and behavior.  Pt educated on the need to practice safe sex while taking this med. Discussed the need for pt to immediately call the office for any new or worsening symptoms, such as worsening depression; feeling nervous or restless; suicidal thoughts or actions; or other changes changes in mood or behavior, and all other concerns. Pt educated on med compliance and the risks of suddenly stopping this medication or missing doses. Pt verbalized understanding and is agreeable to taking Fluoxetine. Addressed all questions and concerns.       Follow up: F/u in 1 month.      TREATMENT PLAN/GOALS: Continue supportive psychotherapy efforts and medications as indicated. Treatment and medication options discussed during today's visit. Patient ackowledged and verbally consented to continue with current treatment plan and was educated on the importance of compliance with treatment and follow-up appointments.    MEDICATION ISSUES:  ANDREW reviewed as expected.  Discussed medication options and treatment plan of prescribed medication as well as the risks, benefits, and side effects including potential falls, possible impaired driving and metabolic adversities among others. Patient is agreeable to call the office with any worsening of symptoms or onset of side effects. Patient is agreeable to call 911 or go to the nearest ER should he/she begin having SI/HI. No medication side effects or related complaints today.            This document has been electronically signed by Rosi Jennings PA-C  September 26, 2023 09:17 EDT      Part of this note may be an electronic transcription/translation of spoken language to printed text using the Dragon Dictation System.

## 2023-10-24 ENCOUNTER — TELEMEDICINE (OUTPATIENT)
Dept: BEHAVIORAL HEALTH | Facility: CLINIC | Age: 23
End: 2023-10-24
Payer: COMMERCIAL

## 2023-10-24 DIAGNOSIS — F33.2 SEVERE EPISODE OF RECURRENT MAJOR DEPRESSIVE DISORDER, WITHOUT PSYCHOTIC FEATURES: ICD-10-CM

## 2023-10-24 DIAGNOSIS — F43.10 POST TRAUMATIC STRESS DISORDER (PTSD): ICD-10-CM

## 2023-10-24 DIAGNOSIS — F41.0 PANIC DISORDER: ICD-10-CM

## 2023-10-24 DIAGNOSIS — F41.1 GENERALIZED ANXIETY DISORDER: ICD-10-CM

## 2023-10-24 PROCEDURE — 99214 OFFICE O/P EST MOD 30 MIN: CPT | Performed by: PHYSICIAN ASSISTANT

## 2023-10-24 PROCEDURE — 1159F MED LIST DOCD IN RCRD: CPT | Performed by: PHYSICIAN ASSISTANT

## 2023-10-24 PROCEDURE — 1160F RVW MEDS BY RX/DR IN RCRD: CPT | Performed by: PHYSICIAN ASSISTANT

## 2023-10-24 RX ORDER — FLUOXETINE HYDROCHLORIDE 40 MG/1
80 CAPSULE ORAL EVERY MORNING
Qty: 60 CAPSULE | Refills: 1 | Status: SHIPPED | OUTPATIENT
Start: 2023-10-24 | End: 2023-11-23

## 2023-10-24 NOTE — PROGRESS NOTES
This provider is located at [address], [address], [place] [address]. The Patient is seen remotely using [employer]. Patient is being seen via telehealth and confirm that they are in a secure environment for this session. The patient's condition being diagnosed/treated is appropriate for telemedicine. The provider identified herself as well as her credentials.   The patient gave consent to be seen remotely, and when consent is given they understand that the consent allows for patient identifiable information to be sent to a third party as needed.   They may refuse to be seen remotely at any time. The electronic data is encrypted and password protected, and the patient has been advised of the potential risks to privacy not withstanding such measures.    Patient is accepting of and agreeable to appointment.  The appointment consisted of the patient and I only.      Mode of visit: Video  [facility] [address], [place] [address].  Location of patient: Home  Does the patient consent to use a video/audio connection for your medical care today? Yes  The visit included audio and video interaction. No technical issues occurred during this visit.    Chief Complaint:  Depression, anxiety    History of Present Illness: Teri Wood is a 23 y.o. female who presents today for f/u of mood.  Patient has been taking meds as prescribed and tolerating them well without any complications.  Pt c/o depression, not constant, comes and goes, occurs a few times a week, rates it a 4/10. Pt reports anhedonia and hopelessness have improved. Pt denies having any SI or HI. No difficulty sleeping. Pt c/o anxiety that is constant, rates it a 6/10. Her anxiety is increased with leaving her house. Her anxiety is worse in social situations, no change. No panic attacks. Pt has had some improvement with feeling on edge and easily irritable. Pt will have reoccurring intrusive thoughts about  past trauma with trigger such as the doctor's office, no change. Pt is currently on a wait list for therapy.     Medical Record Review: Reviewed note from 8/9/23, pt diagnosed with STEF, MDD, PTSD. Pt continues to try to get out some. She agrees that getting out more consistently does help her, but she is still very anxious. Treatment objective plan is to process grief over the loss of her health and particularly her toes and leg; adjust to the changes and how they impact her life forward.       PHQ-9 Depression Screening  Little interest or pleasure in doing things? (P) 1-->several days   Feeling down, depressed, or hopeless? (P) 1-->several days   Trouble falling or staying asleep, or sleeping too much? (P) 0-->not at all   Feeling tired or having little energy? (P) 1-->several days   Poor appetite or overeating? (P) 0-->not at all   Feeling bad about yourself - or that you are a failure or have let yourself or your family down? (P) 1-->several days   Trouble concentrating on things, such as reading the newspaper or watching television? (P) 2-->more than half the days   Moving or speaking so slowly that other people could have noticed? Or the opposite - being so fidgety or restless that you have been moving around a lot more than usual? (P) 0-->not at all   Thoughts that you would be better off dead, or of hurting yourself in some way? (P) 0-->not at all   PHQ-9 Total Score (P) 6   If you checked off any problems, how difficult have these problems made it for you to do your work, take care of things at home, or get along with other people? (P) somewhat difficult         STEF-7  Over the last 2 weeks, how often have you been bothered by any of the following problems?  Feeling nervous, anxious, or on edge: More than half the days  Not being able to stop or control worrying: Several days  Worrying too much about different things: Several days  Trouble relaxing: More than half the days  Being so restless that it is hard  to sit still: Several days  Becoming easily annoyed or irritable: Several days  Feeling afraid as if something awful might happen: More than half the days  STEF-7 Total Score: 10        ROS:  Review of Systems   Constitutional:  Negative for appetite change, diaphoresis and unexpected weight change.   HENT:  Negative for drooling, tinnitus and trouble swallowing.    Eyes:  Negative for visual disturbance.   Respiratory:  Negative for cough, chest tightness and shortness of breath.    Cardiovascular:  Negative for chest pain and palpitations.   Gastrointestinal:  Negative for abdominal pain, constipation, diarrhea, nausea and vomiting.   Endocrine: Negative for cold intolerance and heat intolerance.   Genitourinary:  Negative for difficulty urinating.   Musculoskeletal:  Negative for arthralgias and myalgias.   Skin:  Negative for rash.   Allergic/Immunologic: Negative for immunocompromised state.   Neurological:  Negative for dizziness, tremors, seizures and headaches.   Psychiatric/Behavioral:  Positive for agitation and dysphoric mood. Negative for hallucinations, self-injury, sleep disturbance and suicidal ideas. The patient is nervous/anxious.        Problem List:  Patient Active Problem List   Diagnosis    Abnormal gait    Allergic rhinitis    Asthma    Below-knee amputation of right lower extremity    Depression with anxiety    Impaired mobility    Impairment of balance    Raynaud's disease    Systemic lupus       Current Medications:   Current Outpatient Medications   Medication Sig Dispense Refill    FLUoxetine (PROzac) 40 MG capsule Take 2 capsules by mouth Every Morning for 30 days. 60 capsule 1    amLODIPine (NORVASC) 5 MG tablet Take 1 tablet by mouth Daily.      aspirin 81 MG chewable tablet Chew 1 tablet Daily.      azithromycin (Zithromax Z-Juan Carlos) 250 MG tablet Take 2 tablets the first day, then 1 tablet daily for 4 days. 6 tablet 0    BIOTIN PO Take  by mouth.      cetirizine (zyrTEC) 10 MG tablet Take  1 tablet by mouth.      Cholecalciferol 25 MCG (1000 UT) tablet dispersible Take  by mouth.      cyanocobalamin (VITAMIN B-12) 1000 MCG tablet Take  by mouth.      hydroxychloroquine (PLAQUENIL) 200 MG tablet Take 1 tablet by mouth Daily.      multivitamin (THERAGRAN) tablet tablet Take  by mouth.      Nirmatrelvir&Ritonavir 300/100 (PAXLOVID) 20 x 150 MG & 10 x 100MG tablet therapy pack tablet Take 3 tablets by mouth 2 (Two) Times a Day for 5 days. 30 tablet 0    omeprazole (priLOSEC) 20 MG capsule Take 1 capsule by mouth Daily.      Saccharomyces boulardii (Probiotic) 250 MG capsule Take  by mouth.       No current facility-administered medications for this visit.       Discontinued Medications:  Medications Discontinued During This Encounter   Medication Reason    FLUoxetine (PROzac) 20 MG capsule     FLUoxetine (PROzac) 40 MG capsule          Allergy:   Allergies   Allergen Reactions    Amoxicillin Rash    Amoxicillin-Pot Clavulanate Rash     Mom unsure - was years ago    Azathioprine Rash    Penicillins Rash        Past Medical History:  Past Medical History:   Diagnosis Date    Anxiety     Hypertension     Lupus        Past Surgical History:  Past Surgical History:   Procedure Laterality Date    AMPUTATION Left        Past Psychiatric History:  Began Treatment: 8th grade   Diagnoses: Depression, anxiety, PTSD.   Psychiatrist: Pt last saw a psychiatrist in 8th grade.   Therapist: Pt has been seeing her therapist Jenny Carval[facility] since 8th grade.   Admission History: Pt was admitted once in 8th grade.   Medications/Treatment: Paxil (irritable), Prozac, Lexapro (was sick on this med)  Self Harm: H/o self harm with cutting, which she last did >5 years ago.   Suicide Attempts: Denies  Postpartum depression: Denies    Family Psychiatric History:   Diagnoses: Her mother has a h/o depression. Her mat grandmother with h/o depression.   Substance use: Her pat grandparents had a h/o EtOH abuse.   Suicide  Attempts/Completions: Denies    Family History   Problem Relation Age of Onset    Depression Mother     No Known Problems Father     No Known Problems Sister     No Known Problems Brother     No Known Problems Maternal Aunt     No Known Problems Paternal Aunt     No Known Problems Maternal Uncle     No Known Problems Paternal Uncle     No Known Problems Maternal Grandfather     No Known Problems Maternal Grandmother     No Known Problems Paternal Grandfather     No Known Problems Paternal Grandmother     No Known Problems Cousin     No Known Problems Other     ADD / ADHD Neg Hx     Alcohol abuse Neg Hx     Anxiety disorder Neg Hx     Bipolar disorder Neg Hx     Dementia Neg Hx     Drug abuse Neg Hx     OCD Neg Hx     Paranoid behavior Neg Hx     Schizophrenia Neg Hx     Seizures Neg Hx     Self-Injurious Behavior  Neg Hx     Suicide Attempts Neg Hx        Substance Abuse History:   Alcohol use: Pt will drink once every 1-2 weeks.   Nicotine: Denies  Illicit Drug Use: Pt smokes marijuana daily.   Longest Period Sober: Denies  Rehab/AA/NA: Denies    Social History:  Living Situation: Pt lives with boyfriend.   Marital/Relationship History: 5 years with boyfriend. No abuse or trauma.   Children: Denies  Work History/Occupation: Denies  Education: Pt completed high school, some college.    History: Denies  Legal: Denies    Social History     Socioeconomic History    Marital status: Single   Tobacco Use    Smoking status: Former     Types: Cigarettes     Passive exposure: Never    Smokeless tobacco: Never   Vaping Use    Vaping Use: Former   Substance and Sexual Activity    Alcohol use: Yes     Comment: occ    Drug use: Yes     Types: Marijuana    Sexual activity: Yes     Partners: Male       Developmental History:   Place of birth: [facility]  Siblings: 2 half siblings.   Childhood: Unremarkable. No h/o abuse or trauma.       Physical Exam:  Physical Exam    Appearance:appears to be of stated age,  "maintains good eye contact.   Behavior: Appropriate, cooperative. No acute distress.  Motor: No abnormal movements  Speech: Coherent, spontaneous, appropriate with normal rate, volume, rhythm, and tone. Normal reaction time to questions. No hyperverbal or pressured speech.   Mood: \"I'm okay\"  Affect: Patient appears slightly anxious.  Thought content: Negative suicidal ideations, negative homicidal ideations. Patient denies any obsession, compulsion, or phobia. No evidence of delusions.  Perceptions: Negative auditory hallucinations, negative visual hallucinations. Pt does not appear to be actively responding to internal stimuli.   Thought process: Logical, goal-directed, coherent, and linear with no evidence of flight of ideas, looseness of associations, thought blocking, circumstantiality, or tangentiality.   Insight/Judgement: Fair/fair  Cognition: Alert and oriented to person, place, and date. Memory intact for recent and remote events. Attention and concentration intact.     Vital Signs:   There were no vitals taken for this visit.     Lab Results:   Admission on 10/21/2023, Discharged on 10/21/2023   Component Date Value Ref Range Status    Rapid Strep A Screen 10/21/2023 Negative  Negative, VALID, INVALID, Not Performed Final    Internal Control 10/21/2023 Passed  Passed Final    Lot Number 10/21/2023 [id number]   Final    Expiration Date 10/21/2023 02/27/2025   Final    Rapid Influenza A Ag 10/21/2023 Negative  Negative Final    Rapid Influenza B Ag 10/21/2023 Negative  Negative Final    Internal Control 10/21/2023 Passed  Passed Final    Lot Number 10/21/2023 [id number]   Final    Expiration Date 10/21/2023 10/27/2024   Final    SARS Antigen 10/21/2023 Detected (A)  Not Detected, Presumptive Negative Final    Internal Control 10/21/2023 Passed  Passed Final    Lot Number 10/21/2023 [id number]   Final    Expiration Date 10/21/2023 10/27/2024   Final   Admission on 12/29/2022, Discharged on 12/29/2022   Component " Date Value Ref Range Status    SARS Antigen 12/29/2022 Not Detected   Final    Internal Control 12/29/2022 Passed   Final    Lot Number 12/29/2022 [id number]   Final    Expiration Date 12/29/2022 10,212,023   Final    Rapid Influenza A Ag 12/29/2022 Negative   Final    Rapid Influenza B Ag 12/29/2022 Negative   Final    Internal Control 12/29/2022 Passed   Final    Lot Number 12/29/2022 [id number]   Final    Expiration Date 12/29/2022 10,212,023   Final    Rapid Strep A Screen 12/29/2022 Negative   Final    Internal Control 12/29/2022 Passed   Final    Lot Number 12/29/2022 [id number]   Final    Expiration Date 12/29/2022 2,282,024   Final       EKG Results:  No orders to display       Imaging Results:  No Images in the past 120 days found..      Assessment & Plan   Diagnoses and all orders for this visit:    1. Severe episode of recurrent major depressive disorder, without psychotic features  -     FLUoxetine (PROzac) 40 MG capsule; Take 2 capsules by mouth Every Morning for 30 days.  Dispense: 60 capsule; Refill: 1    2. Generalized anxiety disorder  -     FLUoxetine (PROzac) 40 MG capsule; Take 2 capsules by mouth Every Morning for 30 days.  Dispense: 60 capsule; Refill: 1    3. Panic disorder  -     FLUoxetine (PROzac) 40 MG capsule; Take 2 capsules by mouth Every Morning for 30 days.  Dispense: 60 capsule; Refill: 1    4. Post traumatic stress disorder (PTSD)  -     FLUoxetine (PROzac) 40 MG capsule; Take 2 capsules by mouth Every Morning for 30 days.  Dispense: 60 capsule; Refill: 1        Patient screened positive for depression based on a PHQ-9 score of  on . Follow-up recommendations include: Prescribed antidepressant medication treatment, Referral to Mental Health specialist, Suicide Risk Assessment performed, and see assessment below .    Presentation seems most consistent with MDD, STEF, panic disorder, PTSD.  We will increase Prozac for management of depression, anxiety, and overall mood.  Consider effexor at  future visit if appropriate. Reiterated need for therapy. Follow-up in 1 month.  Addressed all questions and concerns.    Visit Diagnoses:    ICD-10-CM ICD-9-CM   1. Severe episode of recurrent major depressive disorder, without psychotic features  F33.2 296.33   2. Generalized anxiety disorder  F41.1 300.02   3. Panic disorder  F41.0 300.01   4. Post traumatic stress disorder (PTSD)  F43.10 309.81         PLAN:  Safety: No acute safety concerns at this time.  Therapy: We will refer for psychotherapy to Nellie bradley in counseling  Risk Assessment: Risk of self-harm acutely is moderate to severe.  Risk factors include anxiety disorder, mood disorder, intermittent SI (passive, no plan or intent, no present SI), h/o self harm in the past, AODA use, and recent psychosocial stressors (pandemic). Protective factors include no family history, denies access to guns/weapons, no history of suicide attempts in the past, healthcare seeking, future orientation, willingness to engage in care.  Risk of self-harm chronically is also moderate to severe, but could be further elevated in the event of treatment noncompliance and/or AODA.  Labs/Diagnostics Ordered:   No orders of the defined types were placed in this encounter.    Medications:   New Medications Ordered This Visit   Medications    FLUoxetine (PROzac) 40 MG capsule     Sig: Take 2 capsules by mouth Every Morning for 30 days.     Dispense:  60 capsule     Refill:  1       Discussed all risks, benefits, alternatives, and side effects of Fluoxetine including but not limited to GI upset, decreased appetite, sexual dysfunction, bleeding risk, seizure risk, insomnia, anxiety, drowsiness, headache, asthenia, tremor, nervousness, activation of amy or hypomania, increased fragility fracture risk, hyponatremia, ocular effects, withdrawal syndrome following abrupt discontinuation, serotonin syndrome, hypersensitivity reaction, and activation of suicidal ideation and  behavior.  Pt educated on the need to practice safe sex while taking this med. Discussed the need for pt to immediately call the office for any new or worsening symptoms, such as worsening depression; feeling nervous or restless; suicidal thoughts or actions; or other changes changes in mood or behavior, and all other concerns. Pt educated on med compliance and the risks of suddenly stopping this medication or missing doses. Pt verbalized understanding and is agreeable to taking Fluoxetine. Addressed all questions and concerns.       Follow up: F/u in 1 month.      TREATMENT PLAN/GOALS: Continue supportive psychotherapy efforts and medications as indicated. Treatment and medication options discussed during today's visit. Patient ackowledged and verbally consented to continue with current treatment plan and was educated on the importance of compliance with treatment and follow-up appointments.    MEDICATION ISSUES:  ANDREW reviewed as expected.  Discussed medication options and treatment plan of prescribed medication as well as the risks, benefits, and side effects including potential falls, possible impaired driving and metabolic adversities among others. Patient is agreeable to call the office with any worsening of symptoms or onset of side effects. Patient is agreeable to call 911 or go to the nearest ER should he/she begin having SI/HI. No medication side effects or related complaints today.            This document has been electronically signed by Rosi Jennings PA-C  October 24, 2023 13:34 EDT      Part of this note may be an electronic transcription/translation of spoken language to printed text using the Dragon Dictation System.

## 2023-12-04 ENCOUNTER — TELEMEDICINE (OUTPATIENT)
Dept: BEHAVIORAL HEALTH | Facility: CLINIC | Age: 23
End: 2023-12-04
Payer: COMMERCIAL

## 2023-12-04 DIAGNOSIS — F33.2 SEVERE EPISODE OF RECURRENT MAJOR DEPRESSIVE DISORDER, WITHOUT PSYCHOTIC FEATURES: ICD-10-CM

## 2023-12-04 DIAGNOSIS — F41.1 GENERALIZED ANXIETY DISORDER: ICD-10-CM

## 2023-12-04 DIAGNOSIS — F41.0 PANIC DISORDER: ICD-10-CM

## 2023-12-04 PROCEDURE — 1160F RVW MEDS BY RX/DR IN RCRD: CPT | Performed by: PHYSICIAN ASSISTANT

## 2023-12-04 PROCEDURE — 99214 OFFICE O/P EST MOD 30 MIN: CPT | Performed by: PHYSICIAN ASSISTANT

## 2023-12-04 PROCEDURE — 1159F MED LIST DOCD IN RCRD: CPT | Performed by: PHYSICIAN ASSISTANT

## 2023-12-04 RX ORDER — FLUOXETINE HYDROCHLORIDE 40 MG/1
80 CAPSULE ORAL EVERY MORNING
Qty: 60 CAPSULE | Refills: 1 | Status: SHIPPED | OUTPATIENT
Start: 2023-12-04 | End: 2024-01-03

## 2023-12-04 RX ORDER — BUSPIRONE HYDROCHLORIDE 10 MG/1
TABLET ORAL
Qty: 90 TABLET | Refills: 1 | Status: SHIPPED | OUTPATIENT
Start: 2023-12-04

## 2023-12-04 NOTE — PROGRESS NOTES
This provider is located at [address] [facility] [address], [address], [place] [address]. The Patient is seen remotely using [employer]. Patient is being seen via telehealth and confirm that they are in a secure environment for this session. The patient's condition being diagnosed/treated is appropriate for telemedicine. The provider identified herself as well as her credentials.   The patient gave consent to be seen remotely, and when consent is given they understand that the consent allows for patient identifiable information to be sent to a third party as needed.   They may refuse to be seen remotely at any time. The electronic data is encrypted and password protected, and the patient has been advised of the potential risks to privacy not withstanding such measures.    Patient is accepting of and agreeable to appointment.  The appointment consisted of the patient and I only.      Mode of visit: Video  Location of provider: [address] [facility] [address], [address], [place] [address].  Location of patient: Home  Does the patient consent to use a video/audio connection for your medical care today? Yes  The visit included audio and video interaction. No technical issues occurred during this visit.    Chief Complaint:  Depression, anxiety    History of Present Illness: Teri Wood is a 23 y.o. female who presents today for f/u of mood.  Patient has been taking meds as prescribed and tolerating them well without any complications.  Pt denies feeling depressed. No hopelessness. Pt denies having any SI or HI. No difficulty sleeping. Pt c/o anxiety that is constant, has been managing this better, rates it a 5/10. Her anxiety is increased with leaving her house, no change. Her anxiety is worse in social situations, no change. No panic attacks. Pt continues to feel on edge and easily irritable. Pt has been doing therapy weekly at [facility].     Medical Record Review: Reviewed note from 8/9/23,  pt diagnosed with STEF, MDD, PTSD. Pt continues to try to get out some. She agrees that getting out more consistently does help her, but she is still very anxious. Treatment objective plan is to process grief over the loss of her health and particularly her toes and leg; adjust to the changes and how they impact her life forward.       PHQ-9 Depression Screening  Little interest or pleasure in doing things?     Feeling down, depressed, or hopeless?     Trouble falling or staying asleep, or sleeping too much?     Feeling tired or having little energy?     Poor appetite or overeating?     Feeling bad about yourself - or that you are a failure or have let yourself or your family down?     Trouble concentrating on things, such as reading the newspaper or watching television?     Moving or speaking so slowly that other people could have noticed? Or the opposite - being so fidgety or restless that you have been moving around a lot more than usual?     Thoughts that you would be better off dead, or of hurting yourself in some way?     PHQ-9 Total Score     If you checked off any problems, how difficult have these problems made it for you to do your work, take care of things at home, or get along with other people?           STEF-7           ROS:  Review of Systems   Constitutional:  Negative for appetite change, diaphoresis and unexpected weight change.   HENT:  Negative for drooling, tinnitus and trouble swallowing.    Eyes:  Negative for visual disturbance.   Respiratory:  Negative for cough, chest tightness and shortness of breath.    Cardiovascular:  Negative for chest pain and palpitations.   Gastrointestinal:  Negative for abdominal pain, constipation, diarrhea, nausea and vomiting.   Endocrine: Negative for cold intolerance and heat intolerance.   Genitourinary:  Negative for difficulty urinating.   Musculoskeletal:  Negative for arthralgias and myalgias.   Skin:  Negative for rash.   Allergic/Immunologic: Negative  for immunocompromised state.   Neurological:  Negative for dizziness, tremors, seizures and headaches.   Psychiatric/Behavioral:  Positive for agitation and dysphoric mood. Negative for hallucinations, self-injury, sleep disturbance and suicidal ideas. The patient is nervous/anxious.        Problem List:  Patient Active Problem List   Diagnosis    Abnormal gait    Allergic rhinitis    Asthma    Below-knee amputation of right lower extremity    Depression with anxiety    Impaired mobility    Impairment of balance    Raynaud's disease    Systemic lupus       Current Medications:   Current Outpatient Medications   Medication Sig Dispense Refill    FLUoxetine (PROzac) 40 MG capsule Take 2 capsules by mouth Every Morning for 30 days. 60 capsule 1    amLODIPine (NORVASC) 5 MG tablet Take 1 tablet by mouth Daily.      aspirin 81 MG chewable tablet Chew 1 tablet Daily.      azithromycin (Zithromax Z-Juan Carlos) 250 MG tablet Take 2 tablets the first day, then 1 tablet daily for 4 days. 6 tablet 0    BIOTIN PO Take  by mouth.      busPIRone (BUSPAR) 10 MG tablet Take 0.5 tab PO TID x 1 week, if tolerated can increase to 1 tab PO TID 90 tablet 1    cetirizine (zyrTEC) 10 MG tablet Take 1 tablet by mouth.      Cholecalciferol 25 MCG (1000 UT) tablet dispersible Take  by mouth.      cyanocobalamin (VITAMIN B-12) 1000 MCG tablet Take  by mouth.      hydroxychloroquine (PLAQUENIL) 200 MG tablet Take 1 tablet by mouth Daily.      multivitamin (THERAGRAN) tablet tablet Take  by mouth.      omeprazole (priLOSEC) 20 MG capsule Take 1 capsule by mouth Daily.      Saccharomyces boulardii (Probiotic) 250 MG capsule Take  by mouth.       No current facility-administered medications for this visit.       Discontinued Medications:  Medications Discontinued During This Encounter   Medication Reason    FLUoxetine (PROzac) 40 MG capsule Reorder           Allergy:   Allergies   Allergen Reactions    Amoxicillin Rash    Amoxicillin-Pot Clavulanate  Rash     Mom unsure - was years ago    Azathioprine Rash    Penicillins Rash        Past Medical History:  Past Medical History:   Diagnosis Date    Anxiety     Hypertension     Lupus        Past Surgical History:  Past Surgical History:   Procedure Laterality Date    AMPUTATION Left        Past Psychiatric History:  Began Treatment: 8th grade   Diagnoses: Depression, anxiety, PTSD.   Psychiatrist: Pt last saw a psychiatrist in 8th grade.   Therapist: Pt has been seeing her therapist Jenny [facility]val[facility] since 8th grade.   Admission History: Pt was admitted once in 8th grade.   Medications/Treatment: Paxil (irritable), Prozac, Lexapro (was sick on this med)  Self Harm: H/o self harm with cutting, which she last did >5 years ago.   Suicide Attempts: Denies  Postpartum depression: Denies    Family Psychiatric History:   Diagnoses: Her mother has a h/o depression. Her mat grandmother with h/o depression.   Substance use: Her pat grandparents had a h/o EtOH abuse.   Suicide Attempts/Completions: Denies    Family History   Problem Relation Age of Onset    Depression Mother     No Known Problems Father     No Known Problems Sister     No Known Problems Brother     No Known Problems Maternal Aunt     No Known Problems Paternal Aunt     No Known Problems Maternal Uncle     No Known Problems Paternal Uncle     No Known Problems Maternal Grandfather     No Known Problems Maternal Grandmother     No Known Problems Paternal Grandfather     No Known Problems Paternal Grandmother     No Known Problems Cousin     No Known Problems Other     ADD / ADHD Neg Hx     Alcohol abuse Neg Hx     Anxiety disorder Neg Hx     Bipolar disorder Neg Hx     Dementia Neg Hx     Drug abuse Neg Hx     OCD Neg Hx     Paranoid behavior Neg Hx     Schizophrenia Neg Hx     Seizures Neg Hx     Self-Injurious Behavior  Neg Hx     Suicide Attempts Neg Hx        Substance Abuse History:   Alcohol use: Pt will drink once every 1-2 weeks.   Nicotine:  "Denies  Illicit Drug Use: Pt smokes marijuana daily.   Longest Period Sober: Denies  Rehab/AA/NA: Denies    Social History:  Living Situation: Pt lives with boyfriend.   Marital/Relationship History: 5 years with boyfriend. No abuse or trauma.   Children: Denies  Work History/Occupation: Denies  Education: Pt completed high school, some college.    History: Denies  Legal: Denies    Social History     Socioeconomic History    Marital status: Single   Tobacco Use    Smoking status: Former     Types: Cigarettes     Passive exposure: Never    Smokeless tobacco: Never   Vaping Use    Vaping Use: Former   Substance and Sexual Activity    Alcohol use: Yes     Comment: occ    Drug use: Yes     Types: Marijuana    Sexual activity: Yes     Partners: Male       Developmental History:   Place of birth: [place]  Siblings: 2 half siblings.   Childhood: Unremarkable. No h/o abuse or trauma.       Physical Exam:  Physical Exam    Appearance:appears to be of stated age, maintains good eye contact.   Behavior: Appropriate, cooperative. No acute distress.  Motor: No abnormal movements  Speech: Coherent, spontaneous, appropriate with normal rate, volume, rhythm, and tone. Normal reaction time to questions. No hyperverbal or pressured speech.   Mood: \"I'm good\"  Affect: Full range, appropriate, congruent with spontaneous emotional reactivity. Normal intensity. No emotional blunting.   Thought content: Negative suicidal ideations, negative homicidal ideations. Patient denies any obsession, compulsion, or phobia. No evidence of delusions.  Perceptions: Negative auditory hallucinations, negative visual hallucinations. Pt does not appear to be actively responding to internal stimuli.   Thought process: Logical, goal-directed, coherent, and linear with no evidence of flight of ideas, looseness of associations, thought blocking, circumstantiality, or tangentiality.   Insight/Judgement: Fair/fair  Cognition: Alert and oriented " to person, place, and date. Memory intact for recent and remote events. Attention and concentration intact.     Vital Signs:   There were no vitals taken for this visit.     Lab Results:   Admission on 10/21/2023, Discharged on 10/21/2023   Component Date Value Ref Range Status    Rapid Strep A Screen 10/21/2023 Negative  Negative, VALID, INVALID, Not Performed Final    Internal Control 10/21/2023 Passed  Passed Final    Lot Number 10/21/2023 [id number]   Final    Expiration Date 10/21/2023 02/27/2025   Final    Rapid Influenza A Ag 10/21/2023 Negative  Negative Final    Rapid Influenza B Ag 10/21/2023 Negative  Negative Final    Internal Control 10/21/2023 Passed  Passed Final    Lot Number 10/21/2023 [id number]   Final    Expiration Date 10/21/2023 10/27/2024   Final    SARS Antigen 10/21/2023 Detected (A)  Not Detected, Presumptive Negative Final    Internal Control 10/21/2023 Passed  Passed Final    Lot Number 10/21/2023 [id number]   Final    Expiration Date 10/21/2023 10/27/2024   Final   Admission on 12/29/2022, Discharged on 12/29/2022   Component Date Value Ref Range Status    SARS Antigen 12/29/2022 Not Detected   Final    Internal Control 12/29/2022 Passed   Final    Lot Number 12/29/2022 [id number]   Final    Expiration Date 12/29/2022 10,212,023   Final    Rapid Influenza A Ag 12/29/2022 Negative   Final    Rapid Influenza B Ag 12/29/2022 Negative   Final    Internal Control 12/29/2022 Passed   Final    Lot Number 12/29/2022 [id number]   Final    Expiration Date 12/29/2022 10,212,023   Final    Rapid Strep A Screen 12/29/2022 Negative   Final    Internal Control 12/29/2022 Passed   Final    Lot Number 12/29/2022 [id number]   Final    Expiration Date 12/29/2022 2,282,024   Final       EKG Results:  No orders to display       Imaging Results:  No Images in the past 120 days found..      Assessment & Plan   Diagnoses and all orders for this visit:    1. Severe episode of recurrent major depressive disorder, without  psychotic features  -     busPIRone (BUSPAR) 10 MG tablet; Take 0.5 tab PO TID x 1 week, if tolerated can increase to 1 tab PO TID  Dispense: 90 tablet; Refill: 1  -     FLUoxetine (PROzac) 40 MG capsule; Take 2 capsules by mouth Every Morning for 30 days.  Dispense: 60 capsule; Refill: 1    2. Generalized anxiety disorder  -     busPIRone (BUSPAR) 10 MG tablet; Take 0.5 tab PO TID x 1 week, if tolerated can increase to 1 tab PO TID  Dispense: 90 tablet; Refill: 1  -     FLUoxetine (PROzac) 40 MG capsule; Take 2 capsules by mouth Every Morning for 30 days.  Dispense: 60 capsule; Refill: 1    3. Panic disorder  -     busPIRone (BUSPAR) 10 MG tablet; Take 0.5 tab PO TID x 1 week, if tolerated can increase to 1 tab PO TID  Dispense: 90 tablet; Refill: 1  -     FLUoxetine (PROzac) 40 MG capsule; Take 2 capsules by mouth Every Morning for 30 days.  Dispense: 60 capsule; Refill: 1          Patient screened positive for depression based on a PHQ-9 score of  on . Follow-up recommendations include: Prescribed antidepressant medication treatment, Referral to Mental Health specialist, Suicide Risk Assessment performed, and see assessment below .    Presentation seems most consistent with MDD, STEF, panic disorder.  Patient states she is happy with Prozac.  We will continue Prozac for management of depression, anxiety, and overall mood.  Consider effexor at future visit if appropriate.  We will start on BuSpar for management depression, anxiety, and overall mood.  Instructed patient to contact the office for any new or worsening symptoms or any other concerns.  Continue therapy.  Follow-up in 1 month.  Addressed all questions and concerns.    Visit Diagnoses:    ICD-10-CM ICD-9-CM   1. Severe episode of recurrent major depressive disorder, without psychotic features  F33.2 296.33   2. Generalized anxiety disorder  F41.1 300.02   3. Panic disorder  F41.0 300.01           PLAN:  Safety: No acute safety concerns at this  time.  Therapy: We will [facility]  Risk Assessment: Risk of self-harm acutely is moderate to severe.  Risk factors include anxiety disorder, mood disorder, intermittent SI (passive, no plan or intent, no present SI), h/o self harm in the past, AODA use, and recent psychosocial stressors (pandemic). Protective factors include no family history, denies access to guns/weapons, no history of suicide attempts in the past, healthcare seeking, future orientation, willingness to engage in care.  Risk of self-harm chronically is also moderate to severe, but could be further elevated in the event of treatment noncompliance and/or AODA.  Labs/Diagnostics Ordered:   No orders of the defined types were placed in this encounter.    Medications:   New Medications Ordered This Visit   Medications    busPIRone (BUSPAR) 10 MG tablet     Sig: Take 0.5 tab PO TID x 1 week, if tolerated can increase to 1 tab PO TID     Dispense:  90 tablet     Refill:  1    FLUoxetine (PROzac) 40 MG capsule     Sig: Take 2 capsules by mouth Every Morning for 30 days.     Dispense:  60 capsule     Refill:  1       Discussed all risks, benefits, alternatives, and side effects of Fluoxetine including but not limited to GI upset, decreased appetite, sexual dysfunction, bleeding risk, seizure risk, insomnia, anxiety, drowsiness, headache, asthenia, tremor, nervousness, activation of amy or hypomania, increased fragility fracture risk, hyponatremia, ocular effects, withdrawal syndrome following abrupt discontinuation, serotonin syndrome, hypersensitivity reaction, and activation of suicidal ideation and behavior.  Pt educated on the need to practice safe sex while taking this med. Discussed the need for pt to immediately call the office for any new or worsening symptoms, such as worsening depression; feeling nervous or restless; suicidal thoughts or actions; or other changes changes in mood or behavior,  and all other concerns. Pt educated on med compliance and the risks of suddenly stopping this medication or missing doses. Pt verbalized understanding and is agreeable to taking Fluoxetine. Addressed all questions and concerns.     Discussed all risks, benefits, alternatives, and side effects of Buspirone including but not limited to GI upset, dizziness, sedation, HA, nervousness, restlessness, and serotonin syndrome.  Pt educated on the need to practice safe sex while taking this med. Discussed the need for pt to immediately call the office for any new or worsening symptoms, and all other concerns. Pt educated on med compliance. Pt verbalized understanding and is agreeable to taking Buspirone. Addressed all questions and concerns.       Follow up: F/u in 1 month.      TREATMENT PLAN/GOALS: Continue supportive psychotherapy efforts and medications as indicated. Treatment and medication options discussed during today's visit. Patient ackowledged and verbally consented to continue with current treatment plan and was educated on the importance of compliance with treatment and follow-up appointments.    MEDICATION ISSUES:  ANDREW reviewed as expected.  Discussed medication options and treatment plan of prescribed medication as well as the risks, benefits, and side effects including potential falls, possible impaired driving and metabolic adversities among others. Patient is agreeable to call the office with any worsening of symptoms or onset of side effects. Patient is agreeable to call 911 or go to the nearest ER should he/she begin having SI/HI. No medication side effects or related complaints today.            This document has been electronically signed by Rosi Jennings PA-C  December 4, 2023 10:00 EST      Part of this note may be an electronic transcription/translation of spoken language to printed text using the Dragon Dictation System.

## 2024-01-10 ENCOUNTER — TELEMEDICINE (OUTPATIENT)
Dept: PSYCHIATRY | Facility: CLINIC | Age: 24
End: 2024-01-10
Payer: COMMERCIAL

## 2024-01-10 DIAGNOSIS — F41.0 PANIC DISORDER: ICD-10-CM

## 2024-01-10 DIAGNOSIS — G47.00 INSOMNIA, UNSPECIFIED TYPE: ICD-10-CM

## 2024-01-10 DIAGNOSIS — F33.42 RECURRENT MAJOR DEPRESSIVE DISORDER, IN FULL REMISSION: ICD-10-CM

## 2024-01-10 DIAGNOSIS — F41.1 GENERALIZED ANXIETY DISORDER: Primary | ICD-10-CM

## 2024-01-10 DIAGNOSIS — F43.10 POST TRAUMATIC STRESS DISORDER (PTSD): ICD-10-CM

## 2024-01-10 RX ORDER — FLUOXETINE HYDROCHLORIDE 40 MG/1
80 CAPSULE ORAL EVERY MORNING
Qty: 180 CAPSULE | Refills: 0 | Status: SHIPPED | OUTPATIENT
Start: 2024-01-10 | End: 2024-04-09

## 2024-01-10 NOTE — PROGRESS NOTES
This provider is located at [address] [facility] [address], [address], [place] [address]. The Patient is seen remotely using [employer]. Patient is being seen via telehealth and confirm that they are in a secure environment for this session. The patient's condition being diagnosed/treated is appropriate for telemedicine. The provider identified herself as well as her credentials.   The patient gave consent to be seen remotely, and when consent is given they understand that the consent allows for patient identifiable information to be sent to a third party as needed.   They may refuse to be seen remotely at any time. The electronic data is encrypted and password protected, and the patient has been advised of the potential risks to privacy not withstanding such measures.    Patient is accepting of and agreeable to appointment.  The appointment consisted of the patient and I only.      Mode of visit: Video  Location of provider: [address] [facility] [address], [address], [place] [address].  Location of patient: Home  Does the patient consent to use a video/audio connection for your medical care today? Yes  The visit included audio and video interaction. No technical issues occurred during this visit.    Chief Complaint:  Depression, anxiety    History of Present Illness: Teri Wood is a 23 y.o. female who presents today for f/u of mood. Pt has been taking buspar once in the morning and tolerating this well. Pt denies feeling depressed. No anhedonia or hopelessness. Pt denies having any SI or HI. She has had some difficulty sleeping. Pt c/o anxiety that is no longer constant, comes and goes, occurs a few times a week, rates it a 5/10. Her anxiety is increased with leaving her house, no change. Her anxiety is worse in social situations, no change. No panic attacks. Pt continues to feel on edge and easily irritable, slightly worse this week. Pt reports this time of the year possibly feels triggering with PTSD. Pt has  been doing therapy weekly at [facility]. Pt reports having tremors when she feels very anxious, ongoing for 2 years, not new.     Medical Record Review: Reviewed note from 8/9/23, pt diagnosed with STEF, MDD, PTSD. Pt continues to try to get out some. She agrees that getting out more consistently does help her, but she is still very anxious. Treatment objective plan is to process grief over the loss of her health and particularly her toes and leg; adjust to the changes and how they impact her life forward.       PHQ-9 Depression Screening  Little interest or pleasure in doing things?     Feeling down, depressed, or hopeless?     Trouble falling or staying asleep, or sleeping too much?     Feeling tired or having little energy?     Poor appetite or overeating?     Feeling bad about yourself - or that you are a failure or have let yourself or your family down?     Trouble concentrating on things, such as reading the newspaper or watching television?     Moving or speaking so slowly that other people could have noticed? Or the opposite - being so fidgety or restless that you have been moving around a lot more than usual?     Thoughts that you would be better off dead, or of hurting yourself in some way?     PHQ-9 Total Score     If you checked off any problems, how difficult have these problems made it for you to do your work, take care of things at home, or get along with other people?           STEF-7           ROS:  Review of Systems   Constitutional:  Negative for appetite change, diaphoresis and unexpected weight change.   HENT:  Negative for drooling, tinnitus and trouble swallowing.    Eyes:  Negative for visual disturbance.   Respiratory:  Negative for cough, chest tightness and shortness of breath.    Cardiovascular:  Negative for chest pain and palpitations.   Gastrointestinal:  Negative for abdominal pain, constipation, diarrhea, nausea and vomiting.   Endocrine: Negative for cold  intolerance and heat intolerance.   Genitourinary:  Negative for difficulty urinating.   Musculoskeletal:  Negative for arthralgias and myalgias.   Skin:  Negative for rash.   Allergic/Immunologic: Negative for immunocompromised state.   Neurological:  Positive for tremors. Negative for dizziness, seizures and headaches.   Psychiatric/Behavioral:  Positive for agitation and sleep disturbance. Negative for dysphoric mood, hallucinations, self-injury and suicidal ideas. The patient is nervous/anxious.        Problem List:  Patient Active Problem List   Diagnosis    Abnormal gait    Allergic rhinitis    Asthma    Below-knee amputation of right lower extremity    Depression with anxiety    Impaired mobility    Impairment of balance    Raynaud's disease    Systemic lupus       Current Medications:   Current Outpatient Medications   Medication Sig Dispense Refill    FLUoxetine (PROzac) 40 MG capsule Take 2 capsules by mouth Every Morning for 90 days. 180 capsule 0    amLODIPine (NORVASC) 5 MG tablet Take 1 tablet by mouth Daily.      aspirin 81 MG chewable tablet Chew 1 tablet Daily.      azithromycin (Zithromax Z-Juan Carlos) 250 MG tablet Take 2 tablets the first day, then 1 tablet daily for 4 days. 6 tablet 0    BIOTIN PO Take  by mouth.      busPIRone (BUSPAR) 10 MG tablet Take 0.5 tab PO TID x 1 week, if tolerated can increase to 1 tab PO TID 90 tablet 1    cetirizine (zyrTEC) 10 MG tablet Take 1 tablet by mouth.      Cholecalciferol 25 MCG (1000 UT) tablet dispersible Take  by mouth.      cyanocobalamin (VITAMIN B-12) 1000 MCG tablet Take  by mouth.      hydroxychloroquine (PLAQUENIL) 200 MG tablet Take 1 tablet by mouth Daily.      multivitamin (THERAGRAN) tablet tablet Take  by mouth.      omeprazole (priLOSEC) 20 MG capsule Take 1 capsule by mouth Daily.      Saccharomyces boulardii (Probiotic) 250 MG capsule Take  by mouth.       No current facility-administered medications for this visit.       Discontinued  Medications:  Medications Discontinued During This Encounter   Medication Reason    FLUoxetine (PROzac) 40 MG capsule Reorder             Allergy:   Allergies   Allergen Reactions    Amoxicillin Rash    Amoxicillin-Pot Clavulanate Rash     Mom unsure - was years ago    Azathioprine Rash    Penicillins Rash        Past Medical History:  Past Medical History:   Diagnosis Date    Anxiety     Hypertension     Lupus        Past Surgical History:  Past Surgical History:   Procedure Laterality Date    AMPUTATION Left        Past Psychiatric History:  Began Treatment: 8th grade   Diagnoses: Depression, anxiety, PTSD.   Psychiatrist: Pt last saw a psychiatrist in 8th grade.   Therapist: Pt has been seeing her therapist [facility] since 8th grade.   Admission History: Pt was admitted once in 8th grade.   Medications/Treatment: Paxil (irritable), Prozac, Lexapro (was sick on this med)  Self Harm: H/o self harm with cutting, which she last did >5 years ago.   Suicide Attempts: Denies  Postpartum depression: Denies    Family Psychiatric History:   Diagnoses: Her mother has a h/o depression. Her mat grandmother with h/o depression.   Substance use: Her pat grandparents had a h/o EtOH abuse.   Suicide Attempts/Completions: Denies    Family History   Problem Relation Age of Onset    Depression Mother     No Known Problems Father     No Known Problems Sister     No Known Problems Brother     No Known Problems Maternal Aunt     No Known Problems Paternal Aunt     No Known Problems Maternal Uncle     No Known Problems Paternal Uncle     No Known Problems Maternal Grandfather     No Known Problems Maternal Grandmother     No Known Problems Paternal Grandfather     No Known Problems Paternal Grandmother     No Known Problems Cousin     No Known Problems Other     ADD / ADHD Neg Hx     Alcohol abuse Neg Hx     Anxiety disorder Neg Hx     Bipolar disorder Neg Hx     Dementia Neg Hx     Drug abuse Neg Hx     OCD Neg Hx     Paranoid  "behavior Neg Hx     Schizophrenia Neg Hx     Seizures Neg Hx     Self-Injurious Behavior  Neg Hx     Suicide Attempts Neg Hx        Substance Abuse History:   Alcohol use: Pt will drink once every 1-2 weeks.   Nicotine: Denies  Illicit Drug Use: Pt smokes marijuana daily.   Longest Period Sober: Denies  Rehab/AA/NA: Denies    Social History:  Living Situation: Pt lives with boyfriend.   Marital/Relationship History: 5 years with boyfriend. No abuse or trauma.   Children: Denies  Work History/Occupation: Denies  Education: Pt completed high school, some college.    History: Denies  Legal: Denies    Social History     Socioeconomic History    Marital status: Single   Tobacco Use    Smoking status: Former     Types: Cigarettes     Passive exposure: Never    Smokeless tobacco: Never   Vaping Use    Vaping Use: Former   Substance and Sexual Activity    Alcohol use: Yes     Comment: occ    Drug use: Yes     Types: Marijuana    Sexual activity: Yes     Partners: Male       Developmental History:   Place of birth: [facility]  Siblings: 2 half siblings.   Childhood: Unremarkable. No h/o abuse or trauma.       Physical Exam:  Physical Exam    Appearance:appears to be of stated age, maintains good eye contact.   Behavior: Appropriate, cooperative. No acute distress.  Motor: No abnormal movements  Speech: Coherent, spontaneous, appropriate with normal rate, volume, rhythm, and tone. Normal reaction time to questions. No hyperverbal or pressured speech.   Mood: \"I'm good\"  Affect: Full range, appropriate, congruent with spontaneous emotional reactivity. Normal intensity. No emotional blunting, no change.  Thought content: Negative suicidal ideations, negative homicidal ideations. Patient denies any obsession, compulsion, or phobia. No evidence of delusions.  Perceptions: Negative auditory hallucinations, negative visual hallucinations. Pt does not appear to be actively responding to internal stimuli.   Thought " process: Logical, goal-directed, coherent, and linear with no evidence of flight of ideas, looseness of associations, thought blocking, circumstantiality, or tangentiality.   Insight/Judgement: Fair/fair  Cognition: Alert and oriented to person, place, and date. Memory intact for recent and remote events. Attention and concentration intact.     Vital Signs:   There were no vitals taken for this visit.     Lab Results:   Admission on 10/21/2023, Discharged on 10/21/2023   Component Date Value Ref Range Status    Rapid Strep A Screen 10/21/2023 Negative  Negative, VALID, INVALID, Not Performed Final    Internal Control 10/21/2023 Passed  Passed Final    Lot Number 10/21/2023 [id number]   Final    Expiration Date 10/21/2023 02/27/2025   Final    Rapid Influenza A Ag 10/21/2023 Negative  Negative Final    Rapid Influenza B Ag 10/21/2023 Negative  Negative Final    Internal Control 10/21/2023 Passed  Passed Final    Lot Number 10/21/2023 [id number]   Final    Expiration Date 10/21/2023 10/27/2024   Final    SARS Antigen 10/21/2023 Detected (A)  Not Detected, Presumptive Negative Final    Internal Control 10/21/2023 Passed  Passed Final    Lot Number 10/21/2023 [id number]   Final    Expiration Date 10/21/2023 10/27/2024   Final       EKG Results:  No orders to display       Imaging Results:  No Images in the past 120 days found..      Assessment & Plan   Diagnoses and all orders for this visit:    1. Generalized anxiety disorder (Primary)  -     FLUoxetine (PROzac) 40 MG capsule; Take 2 capsules by mouth Every Morning for 90 days.  Dispense: 180 capsule; Refill: 0    2. Recurrent major depressive disorder, in full remission  -     FLUoxetine (PROzac) 40 MG capsule; Take 2 capsules by mouth Every Morning for 90 days.  Dispense: 180 capsule; Refill: 0    3. Post traumatic stress disorder (PTSD)  -     FLUoxetine (PROzac) 40 MG capsule; Take 2 capsules by mouth Every Morning for 90 days.  Dispense: 180 capsule; Refill: 0    4.  Panic disorder  -     FLUoxetine (PROzac) 40 MG capsule; Take 2 capsules by mouth Every Morning for 90 days.  Dispense: 180 capsule; Refill: 0    5. Insomnia, unspecified type            Patient screened positive for depression based on a PHQ-9 score of  on . Follow-up recommendations include: Prescribed antidepressant medication treatment, Referral to Mental Health specialist, Suicide Risk Assessment performed, and see assessment below .    Presentation seems most consistent with MDD, STEF, PTSD, panic disorder, insomnia. We will continue Prozac for management of depression, anxiety, and overall mood.  Consider effexor at future visit if appropriate.  Will continue BuSpar for management depression, anxiety, and overall mood. Counseled pt to be compliant with BID dosing. Pt is agreeable to this plan. Pt declines med for sleep. Instructed patient to contact the office for any new or worsening symptoms or any other concerns.  Continue therapy.  Follow-up in 1 month.  Addressed all questions and concerns.    Visit Diagnoses:    ICD-10-CM ICD-9-CM   1. Generalized anxiety disorder  F41.1 300.02   2. Recurrent major depressive disorder, in full remission  F33.42 296.36   3. Post traumatic stress disorder (PTSD)  F43.10 309.81   4. Panic disorder  F41.0 300.01   5. Insomnia, unspecified type  G47.00 780.52             PLAN:  Safety: No acute safety concerns at this time.  Therapy: We will refer for psychotherapy to [facility]  Risk Assessment: Risk of self-harm acutely is moderate to severe.  Risk factors include anxiety disorder, mood disorder, intermittent SI (passive, no plan or intent, no present SI), h/o self harm in the past, AODA use, and recent psychosocial stressors (pandemic). Protective factors include no family history, denies access to guns/weapons, no history of suicide attempts in the past, healthcare seeking, future orientation, willingness to engage in care.  Risk of self-harm  chronically is also moderate to severe, but could be further elevated in the event of treatment noncompliance and/or AODA.  Labs/Diagnostics Ordered:   No orders of the defined types were placed in this encounter.    Medications:   New Medications Ordered This Visit   Medications    FLUoxetine (PROzac) 40 MG capsule     Sig: Take 2 capsules by mouth Every Morning for 90 days.     Dispense:  180 capsule     Refill:  0       Discussed all risks, benefits, alternatives, and side effects of Fluoxetine including but not limited to GI upset, decreased appetite, sexual dysfunction, bleeding risk, seizure risk, insomnia, anxiety, drowsiness, headache, asthenia, tremor, nervousness, activation of amy or hypomania, increased fragility fracture risk, hyponatremia, ocular effects, withdrawal syndrome following abrupt discontinuation, serotonin syndrome, hypersensitivity reaction, and activation of suicidal ideation and behavior.  Pt educated on the need to practice safe sex while taking this med. Discussed the need for pt to immediately call the office for any new or worsening symptoms, such as worsening depression; feeling nervous or restless; suicidal thoughts or actions; or other changes changes in mood or behavior, and all other concerns. Pt educated on med compliance and the risks of suddenly stopping this medication or missing doses. Pt verbalized understanding and is agreeable to taking Fluoxetine. Addressed all questions and concerns.     Discussed all risks, benefits, alternatives, and side effects of Buspirone including but not limited to GI upset, dizziness, sedation, HA, nervousness, restlessness, and serotonin syndrome.  Pt educated on the need to practice safe sex while taking this med. Discussed the need for pt to immediately call the office for any new or worsening symptoms, and all other concerns. Pt educated on med compliance. Pt verbalized understanding and is agreeable to taking Buspirone. Addressed all  questions and concerns.       Follow up: F/u in 1 month.      TREATMENT PLAN/GOALS: Continue supportive psychotherapy efforts and medications as indicated. Treatment and medication options discussed during today's visit. Patient ackowledged and verbally consented to continue with current treatment plan and was educated on the importance of compliance with treatment and follow-up appointments.    MEDICATION ISSUES:  ANDREW reviewed as expected.  Discussed medication options and treatment plan of prescribed medication as well as the risks, benefits, and side effects including potential falls, possible impaired driving and metabolic adversities among others. Patient is agreeable to call the office with any worsening of symptoms or onset of side effects. Patient is agreeable to call 911 or go to the nearest ER should he/she begin having SI/HI. No medication side effects or related complaints today.            This document has been electronically signed by Rosi Jennings PA-C  January 10, 2024 08:43 EST      Part of this note may be an electronic transcription/translation of spoken language to printed text using the Dragon Dictation System.

## 2024-02-01 DIAGNOSIS — F41.1 GENERALIZED ANXIETY DISORDER: ICD-10-CM

## 2024-02-01 DIAGNOSIS — F43.10 POST TRAUMATIC STRESS DISORDER (PTSD): ICD-10-CM

## 2024-02-01 DIAGNOSIS — F33.2 SEVERE EPISODE OF RECURRENT MAJOR DEPRESSIVE DISORDER, WITHOUT PSYCHOTIC FEATURES: ICD-10-CM

## 2024-02-01 DIAGNOSIS — F41.0 PANIC DISORDER: ICD-10-CM

## 2024-02-01 RX ORDER — FLUOXETINE HYDROCHLORIDE 20 MG/1
CAPSULE ORAL
Qty: 30 CAPSULE | Refills: 1 | OUTPATIENT
Start: 2024-02-01

## 2024-02-01 NOTE — TELEPHONE ENCOUNTER
The original prescription was discontinued on 10/24/2023 by Rosi Jennings PA-C. Renewing this prescription may not be appropriate.     NEXT APPT WITH PROVIDER   Appointment with Rosi Jennings PA-C (02/07/2024)     PROVIDER PLEASE ADVISE

## 2024-03-02 DIAGNOSIS — F33.2 SEVERE EPISODE OF RECURRENT MAJOR DEPRESSIVE DISORDER, WITHOUT PSYCHOTIC FEATURES: ICD-10-CM

## 2024-03-02 DIAGNOSIS — F41.1 GENERALIZED ANXIETY DISORDER: ICD-10-CM

## 2024-03-02 DIAGNOSIS — F41.0 PANIC DISORDER: ICD-10-CM

## 2024-03-04 RX ORDER — BUSPIRONE HYDROCHLORIDE 10 MG/1
TABLET ORAL
Qty: 90 TABLET | Refills: 0 | Status: SHIPPED | OUTPATIENT
Start: 2024-03-04 | End: 2024-03-08 | Stop reason: SDUPTHER

## 2024-03-04 NOTE — TELEPHONE ENCOUNTER
NEXT VISIT WITH PROVIDER   Appointment with Rosi Jennings PA-C (03/08/2024)     LAST SEEN BY PROVIDER   Telemedicine with Rosi Jennings PA-C (01/10/2024)     LAST MED REFILL  busPIRone (BUSPAR) 10 MG tablet (12/04/2023)   Dispense Quantity: 90 tablet Refills: 1          Sig: Take 0.5 tab PO TID x 1 week, if tolerated can increase to 1 tab PO TID     PROVIDER PLEASE ADVISE

## 2024-03-08 ENCOUNTER — TELEMEDICINE (OUTPATIENT)
Dept: BEHAVIORAL HEALTH | Facility: CLINIC | Age: 24
End: 2024-03-08
Payer: COMMERCIAL

## 2024-03-08 DIAGNOSIS — F41.0 PANIC DISORDER: ICD-10-CM

## 2024-03-08 DIAGNOSIS — F41.1 GENERALIZED ANXIETY DISORDER: Primary | ICD-10-CM

## 2024-03-08 DIAGNOSIS — F99 INSOMNIA DUE TO OTHER MENTAL DISORDER: ICD-10-CM

## 2024-03-08 DIAGNOSIS — F51.05 INSOMNIA DUE TO OTHER MENTAL DISORDER: ICD-10-CM

## 2024-03-08 DIAGNOSIS — F43.10 POST TRAUMATIC STRESS DISORDER (PTSD): ICD-10-CM

## 2024-03-08 DIAGNOSIS — F33.42 RECURRENT MAJOR DEPRESSIVE DISORDER, IN FULL REMISSION: ICD-10-CM

## 2024-03-08 RX ORDER — BUSPIRONE HYDROCHLORIDE 10 MG/1
10 TABLET ORAL 3 TIMES DAILY
Qty: 90 TABLET | Refills: 1 | Status: SHIPPED | OUTPATIENT
Start: 2024-03-08

## 2024-03-08 RX ORDER — TRAZODONE HYDROCHLORIDE 50 MG/1
TABLET ORAL
Qty: 60 TABLET | Refills: 1 | Status: SHIPPED | OUTPATIENT
Start: 2024-03-08

## 2024-03-08 RX ORDER — FLUOXETINE HYDROCHLORIDE 40 MG/1
80 CAPSULE ORAL EVERY MORNING
Qty: 180 CAPSULE | Refills: 0 | Status: SHIPPED | OUTPATIENT
Start: 2024-03-08 | End: 2024-06-06

## 2024-03-08 NOTE — PROGRESS NOTES
This provider is located at [address] [facility] [facility] [address], [address], [place] [address]. The Patient is seen remotely using [employer]. Patient is being seen via telehealth and confirm that they are in a secure environment for this session. The patient's condition being diagnosed/treated is appropriate for telemedicine. The provider identified herself as well as her credentials.   The patient gave consent to be seen remotely, and when consent is given they understand that the consent allows for patient identifiable information to be sent to a third party as needed.   They may refuse to be seen remotely at any time. The electronic data is encrypted and password protected, and the patient has been advised of the potential risks to privacy not withstanding such measures.    Patient is accepting of and agreeable to appointment.  The appointment consisted of the patient and I only.      Mode of visit: Video  Location of provider: [address], [address], [place] [address].  Location of patient: Home  Does the patient consent to use a video/audio connection for your medical care today? Yes  The visit included audio and video interaction. No technical issues occurred during this visit.    Chief Complaint:  Depression, anxiety    History of Present Illness: Teri Wood is a 24 y.o. female who presents today for f/u of mood.  Patient has been taking meds as prescribed and tolerating well without any complications.  Pt denies feeling depressed. No hopelessness. Pt c/o anhedonia that fluctuates and difficulty with motivation. Pt has had stressors with processing trauma from family. Pt denies having any SI or HI. Pt has had difficulty staying asleep, waking up 5-7 times a night. Pt c/o anxiety that comes and goes, occurs a few times a week, rates it a 4-5/10. Her anxiety is increased with leaving her house, no change. Her anxiety is worse in social situations, no change. No panic attacks. Pt continues to  feel on edge and easily irritable. Pt reports trying to process PTSD from childhood, plans on discussing this with her therapist. Pt has been doing therapy weekly at [facility], next appointment is tomorrow. Pt reports having tremors when she feels very anxious, ongoing for 2 years, not new. Pt has been journaling.     Medical Record Review: Reviewed note from 8/9/23, pt diagnosed with STEF, MDD, PTSD. Pt continues to try to get out some. She agrees that getting out more consistently does help her, but she is still very anxious. Treatment objective plan is to process grief over the loss of her health and particularly her toes and leg; adjust to the changes and how they impact her life forward.       PHQ-9 Depression Screening  Little interest or pleasure in doing things?     Feeling down, depressed, or hopeless?     Trouble falling or staying asleep, or sleeping too much?     Feeling tired or having little energy?     Poor appetite or overeating?     Feeling bad about yourself - or that you are a failure or have let yourself or your family down?     Trouble concentrating on things, such as reading the newspaper or watching television?     Moving or speaking so slowly that other people could have noticed? Or the opposite - being so fidgety or restless that you have been moving around a lot more than usual?     Thoughts that you would be better off dead, or of hurting yourself in some way?     PHQ-9 Total Score     If you checked off any problems, how difficult have these problems made it for you to do your work, take care of things at home, or get along with other people?           STEF-7           ROS:  Review of Systems   Constitutional:  Negative for appetite change, diaphoresis and unexpected weight change.   HENT:  Negative for drooling, tinnitus and trouble swallowing.    Eyes:  Negative for visual disturbance.   Respiratory:  Negative for cough, chest tightness and shortness of breath.     Cardiovascular:  Negative for chest pain and palpitations.   Gastrointestinal:  Negative for abdominal pain, constipation, diarrhea, nausea and vomiting.   Endocrine: Negative for cold intolerance and heat intolerance.   Genitourinary:  Negative for difficulty urinating.   Musculoskeletal:  Negative for arthralgias and myalgias.   Skin:  Negative for rash.   Allergic/Immunologic: Negative for immunocompromised state.   Neurological:  Positive for tremors. Negative for dizziness, seizures and headaches.   Psychiatric/Behavioral:  Positive for agitation and sleep disturbance. Negative for dysphoric mood, hallucinations, self-injury and suicidal ideas. The patient is nervous/anxious.        Problem List:  Patient Active Problem List   Diagnosis    Abnormal gait    Allergic rhinitis    Asthma    Below-knee amputation of right lower extremity    Depression with anxiety    Impaired mobility    Impairment of balance    Raynaud's disease    Systemic lupus       Current Medications:   Current Outpatient Medications   Medication Sig Dispense Refill    busPIRone (BUSPAR) 10 MG tablet Take 1 tablet by mouth 3 (Three) Times a Day. 90 tablet 1    FLUoxetine (PROzac) 40 MG capsule Take 2 capsules by mouth Every Morning for 90 days. 180 capsule 0    amLODIPine (NORVASC) 5 MG tablet Take 1 tablet by mouth Daily.      aspirin 81 MG chewable tablet Chew 1 tablet Daily.      BIOTIN PO Take  by mouth.      cetirizine (zyrTEC) 10 MG tablet Take 1 tablet by mouth.      Cholecalciferol 25 MCG (1000 UT) tablet dispersible Take  by mouth.      cyanocobalamin (VITAMIN B-12) 1000 MCG tablet Take  by mouth.      hydroxychloroquine (PLAQUENIL) 200 MG tablet Take 1 tablet by mouth Daily.      methylPREDNISolone (MEDROL) 4 MG dose pack Take as directed on package instructions. 21 tablet 0    multivitamin (THERAGRAN) tablet tablet Take  by mouth.      omeprazole (priLOSEC) 20 MG capsule Take 1 capsule by mouth Daily.      Saccharomyces boulardii  (Probiotic) 250 MG capsule Take  by mouth.      traZODone (DESYREL) 50 MG tablet Take 0.5 to 2 tab PO QHS PRN sleep 60 tablet 1     No current facility-administered medications for this visit.       Discontinued Medications:  Medications Discontinued During This Encounter   Medication Reason    FLUoxetine (PROzac) 40 MG capsule Reorder    busPIRone (BUSPAR) 10 MG tablet Reorder               Allergy:   Allergies   Allergen Reactions    Amoxicillin Rash    Amoxicillin-Pot Clavulanate Rash     Mom unsure - was years ago    Azathioprine Rash    Penicillins Rash        Past Medical History:  Past Medical History:   Diagnosis Date    Anxiety     Hypertension     Lupus        Past Surgical History:  Past Surgical History:   Procedure Laterality Date    AMPUTATION Right     below the knee       Past Psychiatric History:  Began Treatment: 8th grade   Diagnoses: Depression, anxiety, PTSD.   Psychiatrist: Pt last saw a psychiatrist in 8th grade.   Therapist: Pt has been seeing her therapist Jenny Carvalho [facility] since 8th grade.   Admission History: Pt was admitted once in 8th grade.   Medications/Treatment: Paxil (irritable), Prozac, Lexapro (was sick on this med), melatonin   Self Harm: H/o self harm with cutting, which she last did >5 years ago.   Suicide Attempts: Denies  Postpartum depression: Denies    Family Psychiatric History:   Diagnoses: Her mother has a h/o depression. Her mat grandmother with h/o depression.   Substance use: Her pat grandparents had a h/o EtOH abuse.   Suicide Attempts/Completions: Denies    Family History   Problem Relation Age of Onset    Depression Mother     No Known Problems Father     No Known Problems Sister     No Known Problems Brother     No Known Problems Maternal Aunt     No Known Problems Paternal Aunt     No Known Problems Maternal Uncle     No Known Problems Paternal Uncle     No Known Problems Maternal Grandfather     No Known Problems Maternal Grandmother     No Known Problems  "Paternal Grandfather     No Known Problems Paternal Grandmother     No Known Problems Cousin     No Known Problems Other     ADD / ADHD Neg Hx     Alcohol abuse Neg Hx     Anxiety disorder Neg Hx     Bipolar disorder Neg Hx     Dementia Neg Hx     Drug abuse Neg Hx     OCD Neg Hx     Paranoid behavior Neg Hx     Schizophrenia Neg Hx     Seizures Neg Hx     Self-Injurious Behavior  Neg Hx     Suicide Attempts Neg Hx        Substance Abuse History:   Alcohol use: Pt will drink once every 1-2 weeks.   Nicotine: Denies  Illicit Drug Use: Pt smokes marijuana daily.   Longest Period Sober: Denies  Rehab/AA/NA: Denies    Social History:  Living Situation: Pt lives with boyfriend.   Marital/Relationship History: 5 years with boyfriend. No abuse or trauma.   Children: Denies  Work History/Occupation: Denies  Education: Pt completed high school, some college.    History: Denies  Legal: Denies    Social History     Socioeconomic History    Marital status: Single   Tobacco Use    Smoking status: Former     Types: Cigarettes     Passive exposure: Never    Smokeless tobacco: Never   Vaping Use    Vaping status: Former   Substance and Sexual Activity    Alcohol use: Yes     Comment: occ    Drug use: Yes     Types: Marijuana    Sexual activity: Yes     Partners: Male       Developmental History:   Place of birth: [facility]  Siblings: 2 half siblings.   Childhood: Unremarkable. No h/o abuse or trauma.       Physical Exam:  Physical Exam    Appearance:appears to be of stated age, maintains good eye contact.   Behavior: Appropriate, cooperative. No acute distress.  Motor: No abnormal movements  Speech: Coherent, spontaneous, appropriate with normal rate, volume, rhythm, and tone. Normal reaction time to questions. No hyperverbal or pressured speech.   Mood: \"I'm good\"  Affect: Full range, appropriate, congruent with spontaneous emotional reactivity. Normal intensity. No emotional blunting, no change.  Thought content: " Negative suicidal ideations, negative homicidal ideations. Patient denies any obsession, compulsion, or phobia. No evidence of delusions.  Perceptions: Negative auditory hallucinations, negative visual hallucinations. Pt does not appear to be actively responding to internal stimuli.   Thought process: Logical, goal-directed, coherent, and linear with no evidence of flight of ideas, looseness of associations, thought blocking, circumstantiality, or tangentiality.   Insight/Judgement: Fair/fair  Cognition: Alert and oriented to person, place, and date. Memory intact for recent and remote events. Attention and concentration intact.     Vital Signs:   There were no vitals taken for this visit.     Lab Results:   Admission on 10/21/2023, Discharged on 10/21/2023   Component Date Value Ref Range Status    Rapid Strep A Screen 10/21/2023 Negative  Negative, VALID, INVALID, Not Performed Final    Internal Control 10/21/2023 Passed  Passed Final    Lot Number 10/21/2023 [id number]   Final    Expiration Date 10/21/2023 02/27/2025   Final    Rapid Influenza A Ag 10/21/2023 Negative  Negative Final    Rapid Influenza B Ag 10/21/2023 Negative  Negative Final    Internal Control 10/21/2023 Passed  Passed Final    Lot Number 10/21/2023 [id number]   Final    Expiration Date 10/21/2023 10/27/2024   Final    SARS Antigen 10/21/2023 Detected (A)  Not Detected, Presumptive Negative Final    Internal Control 10/21/2023 Passed  Passed Final    Lot Number 10/21/2023 [id number]   Final    Expiration Date 10/21/2023 10/27/2024   Final       EKG Results:  No orders to display       Imaging Results:  No Images in the past 120 days found..      Assessment & Plan   Diagnoses and all orders for this visit:    1. Generalized anxiety disorder (Primary)  -     busPIRone (BUSPAR) 10 MG tablet; Take 1 tablet by mouth 3 (Three) Times a Day.  Dispense: 90 tablet; Refill: 1  -     FLUoxetine (PROzac) 40 MG capsule; Take 2 capsules by mouth Every Morning for  90 days.  Dispense: 180 capsule; Refill: 0    2. Recurrent major depressive disorder, in full remission  -     busPIRone (BUSPAR) 10 MG tablet; Take 1 tablet by mouth 3 (Three) Times a Day.  Dispense: 90 tablet; Refill: 1    3. Panic disorder  -     busPIRone (BUSPAR) 10 MG tablet; Take 1 tablet by mouth 3 (Three) Times a Day.  Dispense: 90 tablet; Refill: 1  -     FLUoxetine (PROzac) 40 MG capsule; Take 2 capsules by mouth Every Morning for 90 days.  Dispense: 180 capsule; Refill: 0    4. Post traumatic stress disorder (PTSD)  -     FLUoxetine (PROzac) 40 MG capsule; Take 2 capsules by mouth Every Morning for 90 days.  Dispense: 180 capsule; Refill: 0    5. Insomnia due to other mental disorder  -     traZODone (DESYREL) 50 MG tablet; Take 0.5 to 2 tab PO QHS PRN sleep  Dispense: 60 tablet; Refill: 1              Patient screened positive for depression based on a PHQ-9 score of  on . Follow-up recommendations include: Prescribed antidepressant medication treatment, Referral to Mental Health specialist, Suicide Risk Assessment performed, and see assessment below .    Presentation seems most consistent with MDD, STEF, PTSD, panic disorder, insomnia.  Patient declines medication changes such as increasing BuSpar at this time.  We will continue Prozac for management of depression, anxiety, and overall mood.  Consider effexor at future visit if appropriate.  Will continue BuSpar for management depression, anxiety, and overall mood.  We will start on trazodone for sleep as needed.  Instructed patient to contact the office for any new or worsening symptoms or any other concerns.  Continue therapy.  Follow-up in 1 month.  Addressed all questions and concerns.    Visit Diagnoses:    ICD-10-CM ICD-9-CM   1. Generalized anxiety disorder  F41.1 300.02   2. Recurrent major depressive disorder, in full remission  F33.42 296.36   3. Panic disorder  F41.0 300.01   4. Post traumatic stress disorder (PTSD)  F43.10 309.81   5.  Insomnia due to other mental disorder  F51.05 300.9    F99 327.02               PLAN:  Safety: No acute safety concerns at this time.  Therapy: We will refer for psychotherapy to [facility]  Risk Assessment: Risk of self-harm acutely is moderate to severe.  Risk factors include anxiety disorder, mood disorder, intermittent SI (passive, no plan or intent, no present SI), h/o self harm in the past, AODA use, and recent psychosocial stressors (pandemic). Protective factors include no family history, denies access to guns/weapons, no history of suicide attempts in the past, healthcare seeking, future orientation, willingness to engage in care.  Risk of self-harm chronically is also moderate to severe, but could be further elevated in the event of treatment noncompliance and/or AODA.  Labs/Diagnostics Ordered:   No orders of the defined types were placed in this encounter.    Medications:   New Medications Ordered This Visit   Medications    traZODone (DESYREL) 50 MG tablet     Sig: Take 0.5 to 2 tab PO QHS PRN sleep     Dispense:  60 tablet     Refill:  1    busPIRone (BUSPAR) 10 MG tablet     Sig: Take 1 tablet by mouth 3 (Three) Times a Day.     Dispense:  90 tablet     Refill:  1    FLUoxetine (PROzac) 40 MG capsule     Sig: Take 2 capsules by mouth Every Morning for 90 days.     Dispense:  180 capsule     Refill:  0       Discussed all risks, benefits, alternatives, and side effects of Fluoxetine including but not limited to GI upset, decreased appetite, sexual dysfunction, bleeding risk, seizure risk, insomnia, anxiety, drowsiness, headache, asthenia, tremor, nervousness, activation of amy or hypomania, increased fragility fracture risk, hyponatremia, ocular effects, withdrawal syndrome following abrupt discontinuation, serotonin syndrome, hypersensitivity reaction, and activation of suicidal ideation and behavior.  Pt educated on the need to practice safe sex while taking this med.  Discussed the need for pt to immediately call the office for any new or worsening symptoms, such as worsening depression; feeling nervous or restless; suicidal thoughts or actions; or other changes changes in mood or behavior, and all other concerns. Pt educated on med compliance and the risks of suddenly stopping this medication or missing doses. Pt verbalized understanding and is agreeable to taking Fluoxetine. Addressed all questions and concerns.     Discussed all risks, benefits, alternatives, and side effects of Buspirone including but not limited to GI upset, dizziness, sedation, HA, nervousness, restlessness, and serotonin syndrome.  Pt educated on the need to practice safe sex while taking this med. Discussed the need for pt to immediately call the office for any new or worsening symptoms, and all other concerns. Pt educated on med compliance. Pt verbalized understanding and is agreeable to taking Buspirone. Addressed all questions and concerns.     Discussed all risks, benefits, alternatives, and side effects of Trazodone including but not limited to GI upset, sexual dysfunction, dizziness, headache, nervousness, bleeding risk, seizure risk, sedation, headache, activation of amy or hypomania, increased fragility fracture risk, cardiac arrhythmias, priapism, hyponatremia, ocular effects, prolonged QT interval, withdrawal syndrome following abrupt discontinuation, serotonin syndrome, and activation of suicidal ideation and behavior.  Pt educated on the need to practice safe sex while taking this med. Discussed the need for pt to immediately call the office for any new or worsening symptoms, such as worsening depression; feeling nervous or restless; suicidal thoughts or actions; or other changes changes in mood or behavior, and all other concerns. Pt educated on med compliance and the risks of suddenly stopping this medication or missing doses. Pt verbalized understanding and is agreeable to taking  Trazodone. Addressed all questions and concerns.       Follow up: F/u in 1 month.      TREATMENT PLAN/GOALS: Continue supportive psychotherapy efforts and medications as indicated. Treatment and medication options discussed during today's visit. Patient ackowledged and verbally consented to continue with current treatment plan and was educated on the importance of compliance with treatment and follow-up appointments.    MEDICATION ISSUES:  ANDREW reviewed as expected.  Discussed medication options and treatment plan of prescribed medication as well as the risks, benefits, and side effects including potential falls, possible impaired driving and metabolic adversities among others. Patient is agreeable to call the office with any worsening of symptoms or onset of side effects. Patient is agreeable to call 911 or go to the nearest ER should he/she begin having SI/HI. No medication side effects or related complaints today.            This document has been electronically signed by Rosi Jennings PA-C  March 8, 2024 14:40 EST      Part of this note may be an electronic transcription/translation of spoken language to printed text using the Dragon Dictation System.

## 2024-04-01 ENCOUNTER — TELEPHONE (OUTPATIENT)
Dept: PSYCHIATRY | Facility: CLINIC | Age: 24
End: 2024-04-01
Payer: COMMERCIAL

## 2024-04-01 NOTE — TELEPHONE ENCOUNTER
PATIENT WAS REFERRED OUT TO [facility] ON 09/26/2023 SEVERAL ATTEMPTS MADE TO FOLLOW UP INCLUDING MAILING A CONTACT LETTER WITH NO SUCCESS, CLOSING OUT REFERRAL.

## 2024-04-10 ENCOUNTER — TELEMEDICINE (OUTPATIENT)
Dept: PSYCHIATRY | Facility: CLINIC | Age: 24
End: 2024-04-10
Payer: COMMERCIAL

## 2024-04-10 DIAGNOSIS — F51.05 INSOMNIA DUE TO OTHER MENTAL DISORDER: ICD-10-CM

## 2024-04-10 DIAGNOSIS — F43.10 POST TRAUMATIC STRESS DISORDER (PTSD): ICD-10-CM

## 2024-04-10 DIAGNOSIS — F41.0 PANIC DISORDER: ICD-10-CM

## 2024-04-10 DIAGNOSIS — F99 INSOMNIA DUE TO OTHER MENTAL DISORDER: ICD-10-CM

## 2024-04-10 DIAGNOSIS — F33.42 RECURRENT MAJOR DEPRESSIVE DISORDER, IN FULL REMISSION: ICD-10-CM

## 2024-04-10 DIAGNOSIS — F41.1 GENERALIZED ANXIETY DISORDER: Primary | ICD-10-CM

## 2024-04-10 RX ORDER — FLUOXETINE HYDROCHLORIDE 40 MG/1
80 CAPSULE ORAL EVERY MORNING
Qty: 180 CAPSULE | Refills: 0 | Status: SHIPPED | OUTPATIENT
Start: 2024-06-06 | End: 2024-09-04

## 2024-04-10 RX ORDER — BUSPIRONE HYDROCHLORIDE 10 MG/1
10 TABLET ORAL 2 TIMES DAILY
Qty: 180 TABLET | Refills: 0 | Status: SHIPPED | OUTPATIENT
Start: 2024-04-10 | End: 2024-07-09

## 2024-04-10 NOTE — PROGRESS NOTES
"This provider is located at [address], [address], [place] [address]. The Patient is seen remotely using "[employer]hart. Patient is being seen via telehealth and confirm that they are in a secure environment for this session. The patient's condition being diagnosed/treated is appropriate for telemedicine. The provider identified herself as well as her credentials.   The patient gave consent to be seen remotely, and when consent is given they understand that the consent allows for patient identifiable information to be sent to a third party as needed.   They may refuse to be seen remotely at any time. The electronic data is encrypted and password protected, and the patient has been advised of the potential risks to privacy not withstanding such measures.    Patient is accepting of and agreeable to appointment.  The appointment consisted of the patient and I only.      Mode of visit: Video  Location of provider: [address], [place] [address].  Location of patient: Home  Does the patient consent to use a video/audio connection for your medical care today? Yes  The visit included audio and video interaction. No technical issues occurred during this visit.    Chief Complaint:  Depression, anxiety    History of Present Illness: Teri Wood is a 24 y.o. female who presents today for f/u of mood.  Patient has been taking meds as prescribed and tolerating well without any complications.  Pt denies feeling depressed. No hopelessness. Pt c/o difficulty with motivation. Pt reports anhedonia has improved, stating she \"has been addicted to video games.\" Pt denies having any SI or HI. No difficulty sleeping with trazodone as needed. Pt c/o anxiety that comes and goes, occurs a few times a week, rates it a 4/10. Her anxiety is increased with leaving her house and is situational. Her anxiety is worse in social situations and states is situational as well. Pt reports leaving the house " and social anxiety occurs with changes to her planned routine. No panic attacks. Pt continues to feel on edge and easily irritable, fluctuates. Pt has been doing therapy weekly at [facility]. Pt reports having tremors when she feels very anxious, ongoing for 2 years, not new. Pt reports tremors have been ongoing since before being on Prozac. Pt states tremors mostly occurs when she is feeling more anxious. Pt has been journaling.     Medical Record Review: Reviewed note from 8/9/23, pt diagnosed with STEF, MDD, PTSD. Pt continues to try to get out some. She agrees that getting out more consistently does help her, but she is still very anxious. Treatment objective plan is to process grief over the loss of her health and particularly her toes and leg; adjust to the changes and how they impact her life forward.       PHQ-9 Depression Screening  Little interest or pleasure in doing things? (P) 0-->not at all   Feeling down, depressed, or hopeless? (P) 0-->not at all   Trouble falling or staying asleep, or sleeping too much? (P) 0-->not at all   Feeling tired or having little energy? (P) 1-->several days   Poor appetite or overeating? (P) 1-->several days   Feeling bad about yourself - or that you are a failure or have let yourself or your family down? (P) 1-->several days   Trouble concentrating on things, such as reading the newspaper or watching television? (P) 1-->several days   Moving or speaking so slowly that other people could have noticed? Or the opposite - being so fidgety or restless that you have been moving around a lot more than usual? (P) 0-->not at all   Thoughts that you would be better off dead, or of hurting yourself in some way? (P) 0-->not at all   PHQ-9 Total Score (P) 4   If you checked off any problems, how difficult have these problems made it for you to do your work, take care of things at home, or get along with other people? (P) somewhat difficult         STEF-7  Over  the last 2 weeks, how often have you been bothered by any of the following problems?  Feeling nervous, anxious, or on edge: Several days  Not being able to stop or control worrying: Several days  Worrying too much about different things: More than half the days  Trouble relaxing: Several days  Being so restless that it is hard to sit still: Not at all  Becoming easily annoyed or irritable: Several days  Feeling afraid as if something awful might happen: Not at all  STEF-7 Total Score: 6        ROS:  Review of Systems   Constitutional:  Negative for appetite change, diaphoresis and unexpected weight change.   HENT:  Negative for drooling, tinnitus and trouble swallowing.    Eyes:  Negative for visual disturbance.   Respiratory:  Negative for cough, chest tightness and shortness of breath.    Cardiovascular:  Negative for chest pain and palpitations.   Gastrointestinal:  Negative for abdominal pain, constipation, diarrhea, nausea and vomiting.   Endocrine: Negative for cold intolerance and heat intolerance.   Genitourinary:  Negative for difficulty urinating.   Musculoskeletal:  Negative for arthralgias and myalgias.   Skin:  Negative for rash.   Allergic/Immunologic: Negative for immunocompromised state.   Neurological:  Positive for tremors. Negative for dizziness, seizures and headaches.   Psychiatric/Behavioral:  Positive for agitation. Negative for dysphoric mood, hallucinations, self-injury, sleep disturbance and suicidal ideas. The patient is nervous/anxious.        Problem List:  Patient Active Problem List   Diagnosis    Abnormal gait    Allergic rhinitis    Asthma    Below-knee amputation of right lower extremity    Depression with anxiety    Impaired mobility    Impairment of balance    Raynaud's disease    Systemic lupus       Current Medications:   Current Outpatient Medications   Medication Sig Dispense Refill    busPIRone (BUSPAR) 10 MG tablet Take 1 tablet by mouth 2 (Two) Times a Day for 90 days. 180  tablet 0    [START ON 6/6/2024] FLUoxetine (PROzac) 40 MG capsule Take 2 capsules by mouth Every Morning for 90 days. 180 capsule 0    amLODIPine (NORVASC) 5 MG tablet Take 1 tablet by mouth Daily.      aspirin 81 MG chewable tablet Chew 1 tablet Daily.      BIOTIN PO Take  by mouth.      cetirizine (zyrTEC) 10 MG tablet Take 1 tablet by mouth.      Cholecalciferol 25 MCG (1000 UT) tablet dispersible Take  by mouth.      cyanocobalamin (VITAMIN B-12) 1000 MCG tablet Take  by mouth.      hydroxychloroquine (PLAQUENIL) 200 MG tablet Take 1 tablet by mouth Daily.      methylPREDNISolone (MEDROL) 4 MG dose pack Take as directed on package instructions. 21 tablet 0    multivitamin (THERAGRAN) tablet tablet Take  by mouth.      omeprazole (priLOSEC) 20 MG capsule Take 1 capsule by mouth Daily.      Saccharomyces boulardii (Probiotic) 250 MG capsule Take  by mouth.      traZODone (DESYREL) 50 MG tablet Take 0.5 to 2 tab PO QHS PRN sleep 60 tablet 1     No current facility-administered medications for this visit.       Discontinued Medications:  Medications Discontinued During This Encounter   Medication Reason    busPIRone (BUSPAR) 10 MG tablet Reorder    FLUoxetine (PROzac) 40 MG capsule Reorder                 Allergy:   Allergies   Allergen Reactions    Amoxicillin Rash    Amoxicillin-Pot Clavulanate Rash     Mom unsure - was years ago    Azathioprine Rash    Penicillins Rash        Past Medical History:  Past Medical History:   Diagnosis Date    Anxiety     Hypertension     Lupus        Past Surgical History:  Past Surgical History:   Procedure Laterality Date    AMPUTATION Right     below the knee       Past Psychiatric History:  Began Treatment: 8th grade   Diagnoses: Depression, anxiety, PTSD.   Psychiatrist: Pt last saw a psychiatrist in 8th grade.   Therapist: Pt has been seeing her therapist Jenny [facility] since 8th grade.   Admission History: Pt was admitted once in 8th grade.   Medications/Treatment: Paxil  (irritable), Prozac, Lexapro (was sick on this med), melatonin   Self Harm: H/o self harm with cutting, which she last did >5 years ago.   Suicide Attempts: Denies  Postpartum depression: Denies    Family Psychiatric History:   Diagnoses: Her mother has a h/o depression. Her mat grandmother with h/o depression.   Substance use: Her pat grandparents had a h/o EtOH abuse.   Suicide Attempts/Completions: Denies    Family History   Problem Relation Age of Onset    Depression Mother     No Known Problems Father     No Known Problems Sister     No Known Problems Brother     No Known Problems Maternal Aunt     No Known Problems Paternal Aunt     No Known Problems Maternal Uncle     No Known Problems Paternal Uncle     No Known Problems Maternal Grandfather     No Known Problems Maternal Grandmother     No Known Problems Paternal Grandfather     No Known Problems Paternal Grandmother     No Known Problems Cousin     No Known Problems Other     ADD / ADHD Neg Hx     Alcohol abuse Neg Hx     Anxiety disorder Neg Hx     Bipolar disorder Neg Hx     Dementia Neg Hx     Drug abuse Neg Hx     OCD Neg Hx     Paranoid behavior Neg Hx     Schizophrenia Neg Hx     Seizures Neg Hx     Self-Injurious Behavior  Neg Hx     Suicide Attempts Neg Hx        Substance Abuse History:   Alcohol use: Pt will drink once every 1-2 weeks.   Nicotine: Denies  Illicit Drug Use: Pt smokes marijuana daily.   Longest Period Sober: Denies  Rehab/AA/NA: Denies    Social History:  Living Situation: Pt lives with boyfriend.   Marital/Relationship History: 5 years with boyfriend. No abuse or trauma.   Children: Denies  Work History/Occupation: Denies  Education: Pt completed high school, some college.    History: Denies  Legal: Denies    Social History     Socioeconomic History    Marital status: Single   Tobacco Use    Smoking status: Former     Types: Cigarettes     Passive exposure: Never    Smokeless tobacco: Never   Vaping Use    Vaping status:  "Former   Substance and Sexual Activity    Alcohol use: Yes     Comment: occ    Drug use: Yes     Types: Marijuana    Sexual activity: Yes     Partners: Male       Developmental History:   Place of birth: [place]  Siblings: 2 half siblings.   Childhood: Unremarkable. No h/o abuse or trauma.       Physical Exam:  Physical Exam    Appearance:appears to be of stated age, maintains good eye contact.   Behavior: Appropriate, cooperative. No acute distress.  Motor: No abnormal movements  Speech: Coherent, spontaneous, appropriate with normal rate, volume, rhythm, and tone. Normal reaction time to questions. No hyperverbal or pressured speech.   Mood: \"I'm good\"  Affect: Full range, appropriate, congruent with spontaneous emotional reactivity. Normal intensity. No emotional blunting, no change.  Thought content: Negative suicidal ideations, negative homicidal ideations. Patient denies any obsession, compulsion, or phobia. No evidence of delusions.  Perceptions: Negative auditory hallucinations, negative visual hallucinations. Pt does not appear to be actively responding to internal stimuli.   Thought process: Logical, goal-directed, coherent, and linear with no evidence of flight of ideas, looseness of associations, thought blocking, circumstantiality, or tangentiality.   Insight/Judgement: Fair/fair  Cognition: Alert and oriented to person, place, and date. Memory intact for recent and remote events. Attention and concentration intact.     I have reexamined the patient and the results are consistent with the previously documented exam. Rosi Jennings PA-C       Vital Signs:   There were no vitals taken for this visit.     Lab Results:   Admission on 10/21/2023, Discharged on 10/21/2023   Component Date Value Ref Range Status    Rapid Strep A Screen 10/21/2023 Negative  Negative, VALID, INVALID, Not Performed Final    Internal Control 10/21/2023 Passed  Passed Final    Lot Number 10/21/2023 [id number]   Final    " Expiration Date 10/21/2023 02/27/2025   Final    Rapid Influenza A Ag 10/21/2023 Negative  Negative Final    Rapid Influenza B Ag 10/21/2023 Negative  Negative Final    Internal Control 10/21/2023 Passed  Passed Final    Lot Number 10/21/2023 [id number]   Final    Expiration Date 10/21/2023 10/27/2024   Final    SARS Antigen 10/21/2023 Detected (A)  Not Detected, Presumptive Negative Final    Internal Control 10/21/2023 Passed  Passed Final    Lot Number 10/21/2023 [id number]   Final    Expiration Date 10/21/2023 10/27/2024   Final       EKG Results:  No orders to display       Imaging Results:  No Images in the past 120 days found..      Assessment & Plan   Diagnoses and all orders for this visit:    1. Generalized anxiety disorder (Primary)  -     busPIRone (BUSPAR) 10 MG tablet; Take 1 tablet by mouth 2 (Two) Times a Day for 90 days.  Dispense: 180 tablet; Refill: 0  -     FLUoxetine (PROzac) 40 MG capsule; Take 2 capsules by mouth Every Morning for 90 days.  Dispense: 180 capsule; Refill: 0    2. Recurrent major depressive disorder, in full remission  -     busPIRone (BUSPAR) 10 MG tablet; Take 1 tablet by mouth 2 (Two) Times a Day for 90 days.  Dispense: 180 tablet; Refill: 0    3. Panic disorder  -     busPIRone (BUSPAR) 10 MG tablet; Take 1 tablet by mouth 2 (Two) Times a Day for 90 days.  Dispense: 180 tablet; Refill: 0  -     FLUoxetine (PROzac) 40 MG capsule; Take 2 capsules by mouth Every Morning for 90 days.  Dispense: 180 capsule; Refill: 0    4. Post traumatic stress disorder (PTSD)  -     FLUoxetine (PROzac) 40 MG capsule; Take 2 capsules by mouth Every Morning for 90 days.  Dispense: 180 capsule; Refill: 0    5. Insomnia due to other mental disorder                Patient screened positive for depression based on a PHQ-9 score of  on . Follow-up recommendations include: Prescribed antidepressant medication treatment, Referral to Mental Health specialist, Suicide Risk Assessment performed, and see  assessment below .    Presentation seems most consistent with MDD, STEF, PTSD, panic disorder, insomnia. Pt reports taking buspar BID. Discussed medication options such as increasing buspar to 15mg BID. Pt declines med changes at this time. We will continue Prozac for management of depression, anxiety, and overall mood.  Consider effexor at future visit if appropriate.  Will continue BuSpar for management depression, anxiety, and overall mood.  Will continue trazodone for sleep as needed. Pt denies needing a refill.  Instructed patient to contact the office for any new or worsening symptoms or any other concerns.  Continue therapy.  Follow-up in 2 months. Addressed all questions and concerns.    Visit Diagnoses:    ICD-10-CM ICD-9-CM   1. Generalized anxiety disorder  F41.1 300.02   2. Recurrent major depressive disorder, in full remission  F33.42 296.36   3. Panic disorder  F41.0 300.01   4. Post traumatic stress disorder (PTSD)  F43.10 309.81   5. Insomnia due to other mental disorder  F51.05 300.9    F99 327.02         PLAN:  Safety: No acute safety concerns at this time.  Therapy: We will refer for psychotherapy to [facility] in [facility]  Risk Assessment: Risk of self-harm acutely is moderate to severe.  Risk factors include anxiety disorder, mood disorder, h/o self harm in the past, AODA use, and recent psychosocial stressors (pandemic). Protective factors include no family history, denies access to guns/weapons, no history of suicide attempts in the past, healthcare seeking, no present SI, future orientation, willingness to engage in care.  Risk of self-harm chronically is also moderate to severe, but could be further elevated in the event of treatment noncompliance and/or AODA.  Labs/Diagnostics Ordered:   No orders of the defined types were placed in this encounter.    Medications:   New Medications Ordered This Visit   Medications    busPIRone (BUSPAR) 10 MG tablet     Sig: Take 1 tablet by  mouth 2 (Two) Times a Day for 90 days.     Dispense:  180 tablet     Refill:  0    FLUoxetine (PROzac) 40 MG capsule     Sig: Take 2 capsules by mouth Every Morning for 90 days.     Dispense:  180 capsule     Refill:  0       Discussed all risks, benefits, alternatives, and side effects of Fluoxetine including but not limited to GI upset, decreased appetite, sexual dysfunction, bleeding risk, seizure risk, insomnia, anxiety, drowsiness, headache, asthenia, tremor, nervousness, activation of amy or hypomania, increased fragility fracture risk, hyponatremia, ocular effects, withdrawal syndrome following abrupt discontinuation, serotonin syndrome, hypersensitivity reaction, and activation of suicidal ideation and behavior.  Pt educated on the need to practice safe sex while taking this med. Discussed the need for pt to immediately call the office for any new or worsening symptoms, such as worsening depression; feeling nervous or restless; suicidal thoughts or actions; or other changes changes in mood or behavior, and all other concerns. Pt educated on med compliance and the risks of suddenly stopping this medication or missing doses. Pt verbalized understanding and is agreeable to taking Fluoxetine. Addressed all questions and concerns.     Discussed all risks, benefits, alternatives, and side effects of Buspirone including but not limited to GI upset, dizziness, sedation, HA, nervousness, restlessness, and serotonin syndrome.  Pt educated on the need to practice safe sex while taking this med. Discussed the need for pt to immediately call the office for any new or worsening symptoms, and all other concerns. Pt educated on med compliance. Pt verbalized understanding and is agreeable to taking Buspirone. Addressed all questions and concerns.     Discussed all risks, benefits, alternatives, and side effects of Trazodone including but not limited to GI upset, sexual dysfunction, dizziness, headache, nervousness,  bleeding risk, seizure risk, sedation, headache, activation of amy or hypomania, increased fragility fracture risk, cardiac arrhythmias, priapism, hyponatremia, ocular effects, prolonged QT interval, withdrawal syndrome following abrupt discontinuation, serotonin syndrome, and activation of suicidal ideation and behavior.  Pt educated on the need to practice safe sex while taking this med. Discussed the need for pt to immediately call the office for any new or worsening symptoms, such as worsening depression; feeling nervous or restless; suicidal thoughts or actions; or other changes changes in mood or behavior, and all other concerns. Pt educated on med compliance and the risks of suddenly stopping this medication or missing doses. Pt verbalized understanding and is agreeable to taking Trazodone. Addressed all questions and concerns.       Follow up: F/u in 2 months      TREATMENT PLAN/GOALS: Continue supportive psychotherapy efforts and medications as indicated. Treatment and medication options discussed during today's visit. Patient ackowledged and verbally consented to continue with current treatment plan and was educated on the importance of compliance with treatment and follow-up appointments.    MEDICATION ISSUES:  ANDREW reviewed as expected.  Discussed medication options and treatment plan of prescribed medication as well as the risks, benefits, and side effects including potential falls, possible impaired driving and metabolic adversities among others. Patient is agreeable to call the office with any worsening of symptoms or onset of side effects. Patient is agreeable to call 911 or go to the nearest ER should he/she begin having SI/HI. No medication side effects or related complaints today.            This document has been electronically signed by Rosi Jennings PA-C  April 10, 2024 08:17 EDT      Part of this note may be an electronic transcription/translation of spoken language to printed text using  the Dragon Dictation System.

## 2024-05-31 DIAGNOSIS — F99 INSOMNIA DUE TO OTHER MENTAL DISORDER: ICD-10-CM

## 2024-05-31 DIAGNOSIS — F51.05 INSOMNIA DUE TO OTHER MENTAL DISORDER: ICD-10-CM

## 2024-05-31 NOTE — TELEPHONE ENCOUNTER
REFILL REQUEST FOR TRAZODONE 50MG TABLET    traZODone (DESYREL) 50 MG tablet (03/08/2024)     LAST SEEN VIA TELEMEDICINE   Telemedicine with Rosi Jennings PA-C (04/10/2024)     NEXT FOLLOW UP WITH PROVIDER  Appointment with Rosi Jennings PA-C (06/12/2024)

## 2024-06-04 RX ORDER — TRAZODONE HYDROCHLORIDE 50 MG/1
TABLET ORAL
Qty: 60 TABLET | Refills: 0 | Status: SHIPPED | OUTPATIENT
Start: 2024-06-04

## 2024-06-30 DIAGNOSIS — F99 INSOMNIA DUE TO OTHER MENTAL DISORDER: ICD-10-CM

## 2024-06-30 DIAGNOSIS — F51.05 INSOMNIA DUE TO OTHER MENTAL DISORDER: ICD-10-CM

## 2024-07-01 RX ORDER — TRAZODONE HYDROCHLORIDE 50 MG/1
TABLET ORAL
Qty: 60 TABLET | Refills: 0 | Status: SHIPPED | OUTPATIENT
Start: 2024-07-01

## 2024-07-01 NOTE — TELEPHONE ENCOUNTER
NEXT VISIT WITH PROVIDER   Appointment with Rosi Jennings PA-C (07/24/2024)     LAST SEEN BY PROVIDER   Telemedicine with Rosi Jennings PA-C (04/10/2024)     LAST MED REFILL  traZODone (DESYREL) 50 MG tablet (06/04/2024)     PROVIDER PLEASE ADVISE

## 2024-07-24 ENCOUNTER — TELEMEDICINE (OUTPATIENT)
Dept: PSYCHIATRY | Facility: CLINIC | Age: 24
End: 2024-07-24
Payer: COMMERCIAL

## 2024-07-24 DIAGNOSIS — F51.05 INSOMNIA DUE TO OTHER MENTAL DISORDER: ICD-10-CM

## 2024-07-24 DIAGNOSIS — F33.2 SEVERE EPISODE OF RECURRENT MAJOR DEPRESSIVE DISORDER, WITHOUT PSYCHOTIC FEATURES: Primary | ICD-10-CM

## 2024-07-24 DIAGNOSIS — F41.1 GENERALIZED ANXIETY DISORDER: ICD-10-CM

## 2024-07-24 DIAGNOSIS — F99 INSOMNIA DUE TO OTHER MENTAL DISORDER: ICD-10-CM

## 2024-07-24 DIAGNOSIS — F41.0 PANIC DISORDER: ICD-10-CM

## 2024-07-24 RX ORDER — VENLAFAXINE HYDROCHLORIDE 37.5 MG/1
37.5 CAPSULE, EXTENDED RELEASE ORAL NIGHTLY
Qty: 7 CAPSULE | Refills: 0 | Status: SHIPPED | OUTPATIENT
Start: 2024-07-24 | End: 2024-07-31

## 2024-07-24 RX ORDER — FLUOXETINE HYDROCHLORIDE 20 MG/1
CAPSULE ORAL
Qty: 12 CAPSULE | Refills: 0 | Status: SHIPPED | OUTPATIENT
Start: 2024-07-24 | End: 2024-07-30

## 2024-07-24 RX ORDER — VENLAFAXINE HYDROCHLORIDE 75 MG/1
75 CAPSULE, EXTENDED RELEASE ORAL NIGHTLY
Qty: 30 CAPSULE | Refills: 1 | Status: SHIPPED | OUTPATIENT
Start: 2024-07-24

## 2024-07-24 NOTE — PROGRESS NOTES
This provider is located at [address] [facility] [address], [address], [place] [address]. The Patient is seen remotely using [employer]. Patient is being seen via telehealth and confirm that they are in a secure environment for this session. The patient's condition being diagnosed/treated is appropriate for telemedicine. The provider identified herself as well as her credentials.   The patient gave consent to be seen remotely, and when consent is given they understand that the consent allows for patient identifiable information to be sent to a third party as needed.   They may refuse to be seen remotely at any time. The electronic data is encrypted and password protected, and the patient has been advised of the potential risks to privacy not withstanding such measures.    Patient is accepting of and agreeable to appointment.  The appointment consisted of the patient and I only.      Mode of visit: Video  Location of provider: [facility] [address], [place] [address].  Location of patient: Home  Does the patient consent to use a video/audio connection for your medical care today? Yes  The visit included audio and video interaction. No technical issues occurred during this visit.    Chief Complaint:  Depression, anxiety    History of Present Illness: Teri Wood is a 24 y.o. female who presents today for f/u of mood.  Patient has been taking meds as prescribed and tolerating well without any complications.  Pt thinks she feels depressed, not too sure, but admits she doesn't feel present. She also c/o difficulty with motivation, anhedonia, and hopelessness. Pt denies having any current SI or HI. Pt will have passive SI that occurs every time she has to do something in life and gets overwhelmed. No plan or intent. Pt has been pushing herself more to do things since last visit. No difficulty sleeping with trazodone as needed.     Pt c/o anxiety that comes and goes, depends on the situation, occurs a  few times a week, rates it a 6-8/10. Her anxiety is increased with leaving her house, has been getting better as she has been doing it more. Her anxiety is worse in social situations and states is situational as well. Pt reports leaving the house and social anxiety occurs with changes to her planned routine. No panic attacks. Pt continues to feel on edge and easily irritable, this has been better. Pt has been doing therapy weekly at [facility]. Pt reports having tremors when she feels very anxious, ongoing for 2 years, no new or worsening symptoms.     Medical Record Review: Reviewed note from 8/9/23, pt diagnosed with STEF, MDD, PTSD. Pt continues to try to get out some. She agrees that getting out more consistently does help her, but she is still very anxious. Treatment objective plan is to process grief over the loss of her health and particularly her toes and leg; adjust to the changes and how they impact her life forward.       PHQ-9 Depression Screening  Little interest or pleasure in doing things?     Feeling down, depressed, or hopeless?     Trouble falling or staying asleep, or sleeping too much?     Feeling tired or having little energy?     Poor appetite or overeating?     Feeling bad about yourself - or that you are a failure or have let yourself or your family down?     Trouble concentrating on things, such as reading the newspaper or watching television?     Moving or speaking so slowly that other people could have noticed? Or the opposite - being so fidgety or restless that you have been moving around a lot more than usual?     Thoughts that you would be better off dead, or of hurting yourself in some way?     PHQ-9 Total Score     If you checked off any problems, how difficult have these problems made it for you to do your work, take care of things at home, or get along with other people?           STEF-7           ROS:  Review of Systems   Constitutional:  Negative for  appetite change, diaphoresis and unexpected weight change.   HENT:  Negative for drooling, tinnitus and trouble swallowing.    Eyes:  Negative for visual disturbance.   Respiratory:  Negative for cough, chest tightness and shortness of breath.    Cardiovascular:  Negative for chest pain and palpitations.   Gastrointestinal:  Negative for abdominal pain, constipation, diarrhea, nausea and vomiting.   Endocrine: Negative for cold intolerance and heat intolerance.   Genitourinary:  Negative for difficulty urinating.   Musculoskeletal:  Negative for arthralgias and myalgias.   Skin:  Negative for rash.   Allergic/Immunologic: Negative for immunocompromised state.   Neurological:  Positive for tremors. Negative for dizziness, seizures and headaches.   Psychiatric/Behavioral:  Positive for agitation, dysphoric mood and suicidal ideas. Negative for hallucinations, self-injury and sleep disturbance. The patient is nervous/anxious.        Problem List:  Patient Active Problem List   Diagnosis    Abnormal gait    Allergic rhinitis    Asthma    Below-knee amputation of right lower extremity    Depression with anxiety    Impaired mobility    Impairment of balance    Raynaud's disease    Systemic lupus       Current Medications:   Current Outpatient Medications   Medication Sig Dispense Refill    amLODIPine (NORVASC) 5 MG tablet Take 1 tablet by mouth Daily.      aspirin 81 MG chewable tablet Chew 1 tablet Daily.      BIOTIN PO Take  by mouth.      busPIRone (BUSPAR) 10 MG tablet Take 1 tablet by mouth 2 (Two) Times a Day for 90 days. 180 tablet 0    cetirizine (zyrTEC) 10 MG tablet Take 1 tablet by mouth.      Cholecalciferol 25 MCG (1000 UT) tablet dispersible Take  by mouth.      cyanocobalamin (VITAMIN B-12) 1000 MCG tablet Take  by mouth.      FLUoxetine (PROzac) 20 MG capsule Take 3 capsules by mouth Daily for 2 days, THEN 2 capsules Daily for 2 days, THEN 1 capsule Daily for 2 days. Then stop medication completely 12  capsule 0    hydroxychloroquine (PLAQUENIL) 200 MG tablet Take 1 tablet by mouth Daily.      methylPREDNISolone (MEDROL) 4 MG dose pack Take as directed on package instructions. 21 tablet 0    multivitamin (THERAGRAN) tablet tablet Take  by mouth.      omeprazole (priLOSEC) 20 MG capsule Take 1 capsule by mouth Daily.      Saccharomyces boulardii (Probiotic) 250 MG capsule Take  by mouth.      traZODone (DESYREL) 50 MG tablet TAKE 1/2 TO 2 TABLETS BY MOUTH EVERY NIGHT AT BEDTIME AS NEEDED FOR SLEEP 60 tablet 0    venlafaxine XR (Effexor XR) 37.5 MG 24 hr capsule Take 1 capsule by mouth Every Night for 7 days. Start effexor 75mg after completion if tolerated 7 capsule 0    venlafaxine XR (Effexor XR) 75 MG 24 hr capsule Take 1 capsule by mouth Every Night. 30 capsule 1     No current facility-administered medications for this visit.       Discontinued Medications:  Medications Discontinued During This Encounter   Medication Reason    FLUoxetine (PROzac) 40 MG capsule                    Allergy:   Allergies   Allergen Reactions    Amoxicillin Rash    Amoxicillin-Pot Clavulanate Rash     Mom unsure - was years ago    Azathioprine Rash    Penicillins Rash        Past Medical History:  Past Medical History:   Diagnosis Date    Anxiety     Hypertension     Lupus        Past Surgical History:  Past Surgical History:   Procedure Laterality Date    AMPUTATION Right     below the knee       Past Psychiatric History:  Began Treatment: 8th grade   Diagnoses: Depression, anxiety, PTSD.   Psychiatrist: Pt last saw a psychiatrist in 8th grade.   Therapist: Pt has been seeing her therapist Jenny [facility]val[facility] since 8th grade.   Admission History: Pt was admitted once in 8th grade.   Medications/Treatment: Paxil (irritable), Prozac, Lexapro (was sick on this med), melatonin, Trazodone, Buspar  Self Harm: H/o self harm with cutting, which she last did >5 years ago.   Suicide Attempts: Denies  Postpartum depression: Denies    Family  Psychiatric History:   Diagnoses: Her mother has a h/o depression. Her mat grandmother with h/o depression.   Substance use: Her pat grandparents had a h/o EtOH abuse.   Suicide Attempts/Completions: Denies    Family History   Problem Relation Age of Onset    Depression Mother     No Known Problems Father     No Known Problems Sister     No Known Problems Brother     No Known Problems Maternal Aunt     No Known Problems Paternal Aunt     No Known Problems Maternal Uncle     No Known Problems Paternal Uncle     No Known Problems Maternal Grandfather     No Known Problems Maternal Grandmother     No Known Problems Paternal Grandfather     No Known Problems Paternal Grandmother     No Known Problems Cousin     No Known Problems Other     ADD / ADHD Neg Hx     Alcohol abuse Neg Hx     Anxiety disorder Neg Hx     Bipolar disorder Neg Hx     Dementia Neg Hx     Drug abuse Neg Hx     OCD Neg Hx     Paranoid behavior Neg Hx     Schizophrenia Neg Hx     Seizures Neg Hx     Self-Injurious Behavior  Neg Hx     Suicide Attempts Neg Hx        Substance Abuse History:   Alcohol use: Pt will drink once every 1-2 weeks.   Nicotine: Denies  Illicit Drug Use: Pt smokes marijuana daily.   Longest Period Sober: Denies  Rehab/AA/NA: Denies    Social History:  Living Situation: Pt lives with boyfriend.   Marital/Relationship History: 5 years with boyfriend. No abuse or trauma.   Children: Denies  Work History/Occupation: Denies  Education: Pt completed high school, some college.    History: Denies  Legal: Denies    Social History     Socioeconomic History    Marital status: Single   Tobacco Use    Smoking status: Former     Types: Cigarettes     Passive exposure: Never    Smokeless tobacco: Never   Vaping Use    Vaping status: Former   Substance and Sexual Activity    Alcohol use: Yes     Comment: occ    Drug use: Yes     Types: Marijuana    Sexual activity: Yes     Partners: Male       Developmental History:   Place of birth:  [place]  Siblings: 2 half siblings.   Childhood: Unremarkable. No h/o abuse or trauma.       Physical Exam:  Physical Exam    Appearance:appears to be of stated age, maintains good eye contact.   Behavior: Appropriate, cooperative. No acute distress.  Motor: No abnormal movements  Speech: Coherent, spontaneous, appropriate with normal rate, volume, rhythm, and tone. Normal reaction time to questions. No hyperverbal or pressured speech.   Mood: \"I'm alright\"  Affect: Full range, appropriate, congruent with spontaneous emotional reactivity. Normal intensity. No emotional blunting, no change. Pt smiling at times during visit, mood is not congruent with reported symptoms.  Thought content: Negative suicidal ideations, negative homicidal ideations. Patient denies any obsession, compulsion, or phobia. No evidence of delusions.  Perceptions: Negative auditory hallucinations, negative visual hallucinations. Pt does not appear to be actively responding to internal stimuli.   Thought process: Logical, goal-directed, coherent, and linear with no evidence of flight of ideas, looseness of associations, thought blocking, circumstantiality, or tangentiality.   Insight/Judgement: Fair/fair  Cognition: Alert and oriented to person, place, and date. Memory intact for recent and remote events. Attention and concentration intact.     I have reexamined the patient and the results are consistent with the previously documented exam. Rosi Jennings PA-C         Vital Signs:   There were no vitals taken for this visit.     Lab Results:   Admission on 10/21/2023, Discharged on 10/21/2023   Component Date Value Ref Range Status    Rapid Strep A Screen 10/21/2023 Negative  Negative, VALID, INVALID, Not Performed Final    Internal Control 10/21/2023 Passed  Passed Final    Lot Number 10/21/2023 [id number]   Final    Expiration Date 10/21/2023 02/27/2025   Final    Rapid Influenza A Ag 10/21/2023 Negative  Negative Final    Rapid " Influenza B Ag 10/21/2023 Negative  Negative Final    Internal Control 10/21/2023 Passed  Passed Final    Lot Number 10/21/2023 [id number]   Final    Expiration Date 10/21/2023 10/27/2024   Final    SARS Antigen 10/21/2023 Detected (A)  Not Detected, Presumptive Negative Final    Internal Control 10/21/2023 Passed  Passed Final    Lot Number 10/21/2023 [id number]   Final    Expiration Date 10/21/2023 10/27/2024   Final       EKG Results:  No orders to display       Imaging Results:  No Images in the past 120 days found..      Assessment & Plan   Diagnoses and all orders for this visit:    1. Severe episode of recurrent major depressive disorder, without psychotic features (Primary)  -     FLUoxetine (PROzac) 20 MG capsule; Take 3 capsules by mouth Daily for 2 days, THEN 2 capsules Daily for 2 days, THEN 1 capsule Daily for 2 days. Then stop medication completely  Dispense: 12 capsule; Refill: 0  -     venlafaxine XR (Effexor XR) 37.5 MG 24 hr capsule; Take 1 capsule by mouth Every Night for 7 days. Start effexor 75mg after completion if tolerated  Dispense: 7 capsule; Refill: 0  -     venlafaxine XR (Effexor XR) 75 MG 24 hr capsule; Take 1 capsule by mouth Every Night.  Dispense: 30 capsule; Refill: 1    2. Generalized anxiety disorder  -     FLUoxetine (PROzac) 20 MG capsule; Take 3 capsules by mouth Daily for 2 days, THEN 2 capsules Daily for 2 days, THEN 1 capsule Daily for 2 days. Then stop medication completely  Dispense: 12 capsule; Refill: 0  -     venlafaxine XR (Effexor XR) 37.5 MG 24 hr capsule; Take 1 capsule by mouth Every Night for 7 days. Start effexor 75mg after completion if tolerated  Dispense: 7 capsule; Refill: 0  -     venlafaxine XR (Effexor XR) 75 MG 24 hr capsule; Take 1 capsule by mouth Every Night.  Dispense: 30 capsule; Refill: 1    3. Panic disorder  -     FLUoxetine (PROzac) 20 MG capsule; Take 3 capsules by mouth Daily for 2 days, THEN 2 capsules Daily for 2 days, THEN 1 capsule Daily for  2 days. Then stop medication completely  Dispense: 12 capsule; Refill: 0  -     venlafaxine XR (Effexor XR) 37.5 MG 24 hr capsule; Take 1 capsule by mouth Every Night for 7 days. Start effexor 75mg after completion if tolerated  Dispense: 7 capsule; Refill: 0  -     venlafaxine XR (Effexor XR) 75 MG 24 hr capsule; Take 1 capsule by mouth Every Night.  Dispense: 30 capsule; Refill: 1    4. Insomnia due to other mental disorder                  Patient screened positive for depression based on a PHQ-9 score of  on . Follow-up recommendations include: Prescribed antidepressant medication treatment, Referral to Mental Health specialist, Suicide Risk Assessment performed, and see assessment below .    Presentation seems most consistent with MDD, STEF, panic disorder, insomnia. Pt reports taking buspar BID PRN and denies needing refill. Will continue BuSpar for management depression, anxiety, and overall mood. Will d/c prozac and start on effexor for management of depression, anxiety and overall mood. Discussed required wash out period after stopping prozac. Will continue trazodone for sleep as needed. Pt denies needing a refill.  Instructed patient to contact the office for any new or worsening symptoms or any other concerns.  Continue therapy.  Follow-up in 1 month. Addressed all questions and concerns.    Visit Diagnoses:    ICD-10-CM ICD-9-CM   1. Severe episode of recurrent major depressive disorder, without psychotic features  F33.2 296.33   2. Generalized anxiety disorder  F41.1 300.02   3. Panic disorder  F41.0 300.01   4. Insomnia due to other mental disorder  F51.05 300.9    F99 327.02           PLAN:  Safety: No acute safety concerns at this time.  Therapy: We will refer for psychotherapy to [facility]  Risk Assessment: Risk of self-harm acutely is moderate to severe.  Risk factors include anxiety disorder, mood disorder, h/o self harm in the past, AODA use, and recent psychosocial  stressors (pandemic). Protective factors include no family history, denies access to guns/weapons, no history of suicide attempts in the past, healthcare seeking, no present SI, future orientation, willingness to engage in care.  Risk of self-harm chronically is also moderate to severe, but could be further elevated in the event of treatment noncompliance and/or AODA.  Labs/Diagnostics Ordered:   No orders of the defined types were placed in this encounter.    Medications:   New Medications Ordered This Visit   Medications    FLUoxetine (PROzac) 20 MG capsule     Sig: Take 3 capsules by mouth Daily for 2 days, THEN 2 capsules Daily for 2 days, THEN 1 capsule Daily for 2 days. Then stop medication completely     Dispense:  12 capsule     Refill:  0    venlafaxine XR (Effexor XR) 37.5 MG 24 hr capsule     Sig: Take 1 capsule by mouth Every Night for 7 days. Start effexor 75mg after completion if tolerated     Dispense:  7 capsule     Refill:  0    venlafaxine XR (Effexor XR) 75 MG 24 hr capsule     Sig: Take 1 capsule by mouth Every Night.     Dispense:  30 capsule     Refill:  1       Discussed all risks, benefits, alternatives, and side effects of Venlafaxine including but not limited to GI upset, sexual dysfunction, bleeding risk, seizure risk, insomnia, diaphoresis, weight loss, dizziness, drowsiness, asthenia, activation of amy or hypomania, increased fragility fracture risk, hyponatremia, increased BP, hepatotoxicity, ocular effects, withdrawal syndrome following abrupt discontinuation, serotonin syndrome, and activation of suicidal ideation and behavior.  Pt educated on the need to practice safe sex while taking this med. Discussed the need for pt to immediately call the office for any new or worsening symptoms, such as worsening depression; feeling nervous or restless; suicidal thoughts or actions; or other changes changes in mood or behavior, and all other concerns. Pt educated on med compliance and the  risks of suddenly stopping this medication or missing doses. Pt verbalized understanding and is agreeable to taking Venlafaxine. Addressed all questions and concerns.     Discussed all risks, benefits, alternatives, and side effects of Buspirone including but not limited to GI upset, dizziness, sedation, HA, nervousness, restlessness, and serotonin syndrome.  Pt educated on the need to practice safe sex while taking this med. Discussed the need for pt to immediately call the office for any new or worsening symptoms, and all other concerns. Pt educated on med compliance. Pt verbalized understanding and is agreeable to taking Buspirone. Addressed all questions and concerns.     Discussed all risks, benefits, alternatives, and side effects of Trazodone including but not limited to GI upset, sexual dysfunction, dizziness, headache, nervousness, bleeding risk, seizure risk, sedation, headache, activation of amy or hypomania, increased fragility fracture risk, cardiac arrhythmias, priapism, hyponatremia, ocular effects, prolonged QT interval, withdrawal syndrome following abrupt discontinuation, serotonin syndrome, and activation of suicidal ideation and behavior.  Pt educated on the need to practice safe sex while taking this med. Discussed the need for pt to immediately call the office for any new or worsening symptoms, such as worsening depression; feeling nervous or restless; suicidal thoughts or actions; or other changes changes in mood or behavior, and all other concerns. Pt educated on med compliance and the risks of suddenly stopping this medication or missing doses. Pt verbalized understanding and is agreeable to taking Trazodone. Addressed all questions and concerns.       Follow up: F/u in 1 month      TREATMENT PLAN/GOALS: Continue supportive psychotherapy efforts and medications as indicated. Treatment and medication options discussed during today's visit. Patient ackowledged and verbally consented to  continue with current treatment plan and was educated on the importance of compliance with treatment and follow-up appointments.    MEDICATION ISSUES:  ANDREW reviewed as expected.  Discussed medication options and treatment plan of prescribed medication as well as the risks, benefits, and side effects including potential falls, possible impaired driving and metabolic adversities among others. Patient is agreeable to call the office with any worsening of symptoms or onset of side effects. Patient is agreeable to call 911 or go to the nearest ER should he/she begin having SI/HI. No medication side effects or related complaints today.            This document has been electronically signed by Rosi Jennings PA-C  July 24, 2024 09:22 EDT      Part of this note may be an electronic transcription/translation of spoken language to printed text using the Dragon Dictation System.

## 2024-08-02 DIAGNOSIS — F99 INSOMNIA DUE TO OTHER MENTAL DISORDER: ICD-10-CM

## 2024-08-02 DIAGNOSIS — F51.05 INSOMNIA DUE TO OTHER MENTAL DISORDER: ICD-10-CM

## 2024-08-05 RX ORDER — TRAZODONE HYDROCHLORIDE 50 MG/1
TABLET ORAL
Qty: 60 TABLET | Refills: 0 | Status: SHIPPED | OUTPATIENT
Start: 2024-08-05

## 2024-08-05 NOTE — TELEPHONE ENCOUNTER
NEXT VISIT WITH PROVIDER   Appointment with Rosi Jennings PA-C (09/04/2024)     LAST SEEN BY PROVIDER   Telemedicine with Rosi Jennings PA-C (07/24/2024)     LAST MED REFILL  traZODone (DESYREL) 50 MG tablet (07/01/2024)

## 2024-09-04 ENCOUNTER — TELEMEDICINE (OUTPATIENT)
Dept: PSYCHIATRY | Facility: CLINIC | Age: 24
End: 2024-09-04
Payer: COMMERCIAL

## 2024-09-04 DIAGNOSIS — F33.2 SEVERE EPISODE OF RECURRENT MAJOR DEPRESSIVE DISORDER, WITHOUT PSYCHOTIC FEATURES: ICD-10-CM

## 2024-09-04 DIAGNOSIS — F41.0 PANIC DISORDER: ICD-10-CM

## 2024-09-04 DIAGNOSIS — F51.05 INSOMNIA DUE TO OTHER MENTAL DISORDER: Primary | ICD-10-CM

## 2024-09-04 DIAGNOSIS — F41.1 GENERALIZED ANXIETY DISORDER: ICD-10-CM

## 2024-09-04 DIAGNOSIS — F33.42 RECURRENT MAJOR DEPRESSIVE DISORDER, IN FULL REMISSION: ICD-10-CM

## 2024-09-04 DIAGNOSIS — F99 INSOMNIA DUE TO OTHER MENTAL DISORDER: Primary | ICD-10-CM

## 2024-09-04 PROCEDURE — 99214 OFFICE O/P EST MOD 30 MIN: CPT | Performed by: PHYSICIAN ASSISTANT

## 2024-09-04 PROCEDURE — 1159F MED LIST DOCD IN RCRD: CPT | Performed by: PHYSICIAN ASSISTANT

## 2024-09-04 PROCEDURE — 1160F RVW MEDS BY RX/DR IN RCRD: CPT | Performed by: PHYSICIAN ASSISTANT

## 2024-09-04 RX ORDER — VENLAFAXINE HYDROCHLORIDE 75 MG/1
75 CAPSULE, EXTENDED RELEASE ORAL NIGHTLY
Qty: 90 CAPSULE | Refills: 0 | Status: SHIPPED | OUTPATIENT
Start: 2024-09-04

## 2024-09-04 NOTE — TELEPHONE ENCOUNTER
NEXT VISIT WITH PROVIDER   Appointment with Rosi Jennings PA-C (10/09/2024)     LAST SEEN BY PROVIDER   Telemedicine with Rosi Jennings PA-C (09/04/2024)     LAST MED REFILL  busPIRone (BUSPAR) 10 MG tablet (04/10/2024)     PROVIDER PLEASE ADVISE

## 2024-09-04 NOTE — PROGRESS NOTES
This provider is located at [address] [facility] [address], [address], [place] [address]. The Patient is seen remotely using [employer]. Patient is being seen via telehealth and confirm that they are in a secure environment for this session. The patient's condition being diagnosed/treated is appropriate for telemedicine. The provider identified herself as well as her credentials.   The patient gave consent to be seen remotely, and when consent is given they understand that the consent allows for patient identifiable information to be sent to a third party as needed.   They may refuse to be seen remotely at any time. The electronic data is encrypted and password protected, and the patient has been advised of the potential risks to privacy not withstanding such measures.    Patient is accepting of and agreeable to appointment.  The appointment consisted of the patient and I only.      Mode of visit: Video  Location [facility] provider: [facility] [address], [address], [place] [address].  Location of patient: Home  Does the patient consent to use a video/audio connection for your medical care today? Yes  The visit included audio and video interaction. No technical issues occurred during this visit.    Chief Complaint:  Depression, anxiety    History of Present Illness: Teri Wood is a 24 y.o. female who presents today for f/u of mood.  Patient has been taking meds as prescribed and tolerating well without any complications.  Pt c/o depression that comes and goes, occurs a few times a week, rates it a 2/10. Pt has had some improvement with motivation and anhedonia. Pt will sometimes have hopelessness. Pt denies having any current SI or HI. Pt will have passive SI that occurs every time she has to do something in life and gets overwhelmed. No plan or intent. Pt has been pushing herself more to do things since last visit. No difficulty sleeping with trazodone as needed. Pt c/o anxiety that comes and goes, depends on  the situation, occurs a few times a week, rates it a 4/10. Her anxiety is increased with leaving her house, has been getting better as she has been doing it more. Her anxiety is worse in social situations and states is situational as well, has improved. No panic attacks. Pt continues to feel on edge and easily irritable, this has improved and isn't as frequent. Pt has been doing therapy weekly at [facility]. Pt denies having any tremors since last visit.    Medical Record Review: Reviewed note from 8/9/23, pt diagnosed with STEF, MDD, PTSD. Pt continues to try to get out some. She agrees that getting out more consistently does help her, but she is still very anxious. Treatment objective plan is to process grief over the loss of her health and particularly her toes and leg; adjust to the changes and how they impact her life forward.       PHQ-9 Depression Screening  Little interest or pleasure in doing things?     Feeling down, depressed, or hopeless?     Trouble falling or staying asleep, or sleeping too much?     Feeling tired or having little energy?     Poor appetite or overeating?     Feeling bad about yourself - or that you are a failure or have let yourself or your family down?     Trouble concentrating on things, such as reading the newspaper or watching television?     Moving or speaking so slowly that other people could have noticed? Or the opposite - being so fidgety or restless that you have been moving around a lot more than usual?     Thoughts that you would be better off dead, or of hurting yourself in some way?     PHQ-9 Total Score     If you checked off any problems, how difficult have these problems made it for you to do your work, take care of things at home, or get along with other people?           STEF-7           ROS:  Review of Systems   Constitutional:  Negative for appetite change, diaphoresis and unexpected weight change.   HENT:  Negative for drooling, tinnitus and  trouble swallowing.    Eyes:  Negative for visual disturbance.   Respiratory:  Negative for cough, chest tightness and shortness of breath.    Cardiovascular:  Negative for chest pain and palpitations.   Gastrointestinal:  Negative for abdominal pain, constipation, diarrhea, nausea and vomiting.   Endocrine: Negative for cold intolerance and heat intolerance.   Genitourinary:  Negative for difficulty urinating.   Musculoskeletal:  Negative for arthralgias and myalgias.   Skin:  Negative for rash.   Allergic/Immunologic: Negative for immunocompromised state.   Neurological:  Positive for tremors. Negative for dizziness, seizures and headaches.   Psychiatric/Behavioral:  Positive for agitation, dysphoric mood and suicidal ideas. Negative for hallucinations, self-injury and sleep disturbance. The patient is nervous/anxious.        Problem List:  Patient Active Problem List   Diagnosis    Abnormal gait    Allergic rhinitis    Asthma    Below-knee amputation of right lower extremity    Depression with anxiety    Impaired mobility    Impairment of balance    Raynaud's disease    Systemic lupus       Current Medications:   Current Outpatient Medications   Medication Sig Dispense Refill    venlafaxine XR (Effexor XR) 75 MG 24 hr capsule Take 1 capsule by mouth Every Night. 90 capsule 0    amLODIPine (NORVASC) 5 MG tablet Take 1 tablet by mouth Daily.      aspirin 81 MG chewable tablet Chew 1 tablet Daily.      BIOTIN PO Take  by mouth.      busPIRone (BUSPAR) 10 MG tablet Take 1 tablet by mouth 2 (Two) Times a Day for 90 days. 180 tablet 0    cetirizine (zyrTEC) 10 MG tablet Take 1 tablet by mouth.      Cholecalciferol 25 MCG (1000 UT) tablet dispersible Take  by mouth.      cyanocobalamin (VITAMIN B-12) 1000 MCG tablet Take  by mouth.      hydroxychloroquine (PLAQUENIL) 200 MG tablet Take 1 tablet by mouth Daily.      methylPREDNISolone (MEDROL) 4 MG dose pack Take as directed on package instructions. 21 tablet 0     multivitamin (THERAGRAN) tablet tablet Take  by mouth.      omeprazole (priLOSEC) 20 MG capsule Take 1 capsule by mouth Daily.      Saccharomyces boulardii (Probiotic) 250 MG capsule Take  by mouth.      traZODone (DESYREL) 50 MG tablet TAKE 1/2 TO 2 TABLETS BY MOUTH EVERY NIGHT AT BEDTIME AS NEEDED FOR SLEEP 60 tablet 0     No current facility-administered medications for this visit.       Discontinued Medications:  Medications Discontinued During This Encounter   Medication Reason    FLUoxetine (PROzac) 20 MG capsule     venlafaxine XR (Effexor XR) 37.5 MG 24 hr capsule     venlafaxine XR (Effexor XR) 75 MG 24 hr capsule Reorder                     Allergy:   Allergies   Allergen Reactions    Amoxicillin Rash    Amoxicillin-Pot Clavulanate Rash     Mom unsure - was years ago    Azathioprine Rash    Penicillins Rash        Past Medical History:  Past Medical History:   Diagnosis Date    Anxiety     Hypertension     Lupus        Past Surgical History:  Past Surgical History:   Procedure Laterality Date    AMPUTATION Right     below the knee       Past Psychiatric History:  Began Treatment: 8th grade   Diagnoses: Depression, anxiety, PTSD.   Psychiatrist: Pt last saw a psychiatrist in 8th grade.   Therapist: Pt has been seeing her therapist Jenny Carvalho [facility] since 8th grade.   Admission History: Pt was admitted once in 8th grade.   Medications/Treatment: Paxil (irritable), Prozac, Lexapro (was sick on this med), melatonin, Trazodone, Buspar, Effexor  Self Harm: H/o self harm with cutting, which she last did >5 years ago.   Suicide Attempts: Denies  Postpartum depression: Denies    Family Psychiatric History:   Diagnoses: Her mother has a h/o depression. Her mat grandmother with h/o depression.   Substance use: Her pat grandparents had a h/o EtOH abuse.   Suicide Attempts/Completions: Denies    Family History   Problem Relation Age of Onset    Depression Mother     No Known Problems Father     No Known Problems Sister      No Known Problems Brother     No Known Problems Maternal Aunt     No Known Problems Paternal Aunt     No Known Problems Maternal Uncle     No Known Problems Paternal Uncle     No Known Problems Maternal Grandfather     No Known Problems Maternal Grandmother     No Known Problems Paternal Grandfather     No Known Problems Paternal Grandmother     No Known Problems Cousin     No Known Problems Other     ADD / ADHD Neg Hx     Alcohol abuse Neg Hx     Anxiety disorder Neg Hx     Bipolar disorder Neg Hx     Dementia Neg Hx     Drug abuse Neg Hx     OCD Neg Hx     Paranoid behavior Neg Hx     Schizophrenia Neg Hx     Seizures Neg Hx     Self-Injurious Behavior  Neg Hx     Suicide Attempts Neg Hx        Substance Abuse History:   Alcohol use: Pt will drink once every 1-2 weeks.   Nicotine: Denies  Illicit Drug Use: Pt smokes marijuana daily.   Longest Period Sober: Denies  Rehab/AA/NA: Denies    Social History:  Living Situation: Pt lives with boyfriend.   Marital/Relationship History: 5 years with boyfriend. No abuse or trauma.   Children: Denies  Work History/Occupation: Denies  Education: Pt completed high school, some college.    History: Denies  Legal: Denies    Social History     Socioeconomic History    Marital status: Single   Tobacco Use    Smoking status: Former     Types: Cigarettes     Passive exposure: Never    Smokeless tobacco: Never   Vaping Use    Vaping status: Former   Substance and Sexual Activity    Alcohol use: Yes     Comment: occ    Drug use: Yes     Types: Marijuana    Sexual activity: Yes     Partners: Male       Developmental History:   Place of birth: [facility]  Siblings: 2 half siblings.   Childhood: Unremarkable. No h/o abuse or trauma.       Physical Exam:  Physical Exam    Appearance:appears to be of stated age, maintains good eye contact.   Behavior: Appropriate, cooperative. No acute distress.  Motor: No abnormal movements  Speech: Coherent, spontaneous, appropriate with  "normal rate, volume, rhythm, and tone. Normal reaction time to questions. No hyperverbal or pressured speech.   Mood: \"I'm good\"  Affect: Full range, appropriate, congruent with spontaneous emotional reactivity. Normal intensity. No emotional blunting.   Thought content: Negative suicidal ideations, negative homicidal ideations. Patient denies any obsession, compulsion, or phobia. No evidence of delusions.  Perceptions: Negative auditory hallucinations, negative visual hallucinations. Pt does not appear to be actively responding to internal stimuli.   Thought process: Logical, goal-directed, coherent, and linear with no evidence of flight of ideas, looseness of associations, thought blocking, circumstantiality, or tangentiality.   Insight/Judgement: Fair/fair  Cognition: Alert and oriented to person, place, and date. Memory intact for recent and remote events. Attention and concentration intact.     I have reexamined the patient and the results are consistent with the previously documented exam. Rosi Jennings PA-C         Vital Signs:   There were no vitals taken for this visit.     Lab Results:   Admission on 10/21/2023, Discharged on 10/21/2023   Component Date Value Ref Range Status    Rapid Strep A Screen 10/21/2023 Negative  Negative, VALID, INVALID, Not Performed Final    Internal Control 10/21/2023 Passed  Passed Final    Lot Number 10/21/2023 [id number]   Final    Expiration Date 10/21/2023 02/27/2025   Final    Rapid Influenza A Ag 10/21/2023 Negative  Negative Final    Rapid Influenza B Ag 10/21/2023 Negative  Negative Final    Internal Control 10/21/2023 Passed  Passed Final    Lot Number 10/21/2023 [id number]   Final    Expiration Date 10/21/2023 10/27/2024   Final    SARS Antigen 10/21/2023 Detected (A)  Not Detected, Presumptive Negative Final    Internal Control 10/21/2023 Passed  Passed Final    Lot Number 10/21/2023 [id number]   Final    Expiration Date 10/21/2023 10/27/2024   Final       EKG " Results:  No orders to display       Imaging Results:  No Images in the past 120 days found..      Assessment & Plan   Diagnoses and all orders for this visit:    1. Insomnia due to other mental disorder (Primary)    2. Severe episode of recurrent major depressive disorder, without psychotic features  -     venlafaxine XR (Effexor XR) 75 MG 24 hr capsule; Take 1 capsule by mouth Every Night.  Dispense: 90 capsule; Refill: 0    3. Generalized anxiety disorder  -     venlafaxine XR (Effexor XR) 75 MG 24 hr capsule; Take 1 capsule by mouth Every Night.  Dispense: 90 capsule; Refill: 0    4. Panic disorder  -     venlafaxine XR (Effexor XR) 75 MG 24 hr capsule; Take 1 capsule by mouth Every Night.  Dispense: 90 capsule; Refill: 0                    Patient screened positive for depression based on a PHQ-9 score of  on . Follow-up recommendations include: Prescribed antidepressant medication treatment, Referral to Mental Health specialist, Suicide Risk Assessment performed, and see assessment below .    Presentation seems most consistent with MDD, STEF, panic disorder, insomnia. Pt reports taking buspar QD and twice a day as needed and denies needing refill. Will continue BuSpar for management depression, anxiety, and overall mood.  Patient declines increasing Effexor and feels she is stable at current dose of this med.  We will continue effexor for management of depression, anxiety and overall mood. Will continue trazodone for sleep as needed. Pt denies needing a refill.  Instructed patient to contact the office for any new or worsening symptoms or any other concerns.  Continue therapy.  Follow-up in 1 month. Addressed all questions and concerns.    Visit Diagnoses:    ICD-10-CM ICD-9-CM   1. Insomnia due to other mental disorder  F51.05 300.9    F99 327.02   2. Severe episode of recurrent major depressive disorder, without psychotic features  F33.2 296.33   3. Generalized anxiety disorder  F41.1 300.02   4. Panic  disorder  F41.0 300.01             PLAN:  Safety: No acute safety concerns at this time.  Therapy: We [facility]  Risk Assessment: Risk of self-harm acutely is moderate to severe.  Risk factors include anxiety disorder, mood disorder, h/o self harm in the past, AODA use, and recent psychosocial stressors (pandemic). Protective factors include no family history, denies access to guns/weapons, no history of suicide attempts in the past, healthcare seeking, no present SI, future orientation, willingness to engage in care.  Risk of self-harm chronically is also moderate to severe, but could be further elevated in the event of treatment noncompliance and/or AODA.  Labs/Diagnostics Ordered:   No orders of the defined types were placed in this encounter.    Medications:   New Medications Ordered This Visit   Medications    venlafaxine XR (Effexor XR) 75 MG 24 hr capsule     Sig: Take 1 capsule by mouth Every Night.     Dispense:  90 capsule     Refill:  0       Discussed all risks, benefits, alternatives, and side effects of Venlafaxine including but not limited to GI upset, sexual dysfunction, bleeding risk, seizure risk, insomnia, diaphoresis, weight loss, dizziness, drowsiness, asthenia, activation of amy or hypomania, increased fragility fracture risk, hyponatremia, increased BP, hepatotoxicity, ocular effects, withdrawal syndrome following abrupt discontinuation, serotonin syndrome, and activation of suicidal ideation and behavior.  Pt educated on the need to practice safe sex while taking this med. Discussed the need for pt to immediately call the office for any new or worsening symptoms, such as worsening depression; feeling nervous or restless; suicidal thoughts or actions; or other changes changes in mood or behavior, and all other concerns. Pt educated on med compliance and the risks of suddenly stopping this medication or missing doses. Pt verbalized  understanding and is agreeable to taking Venlafaxine. Addressed all questions and concerns.     Discussed all risks, benefits, alternatives, and side effects of Buspirone including but not limited to GI upset, dizziness, sedation, HA, nervousness, restlessness, and serotonin syndrome.  Pt educated on the need to practice safe sex while taking this med. Discussed the need for pt to immediately call the office for any new or worsening symptoms, and all other concerns. Pt educated on med compliance. Pt verbalized understanding and is agreeable to taking Buspirone. Addressed all questions and concerns.     Discussed all risks, benefits, alternatives, and side effects of Trazodone including but not limited to GI upset, sexual dysfunction, dizziness, headache, nervousness, bleeding risk, seizure risk, sedation, headache, activation of amy or hypomania, increased fragility fracture risk, cardiac arrhythmias, priapism, hyponatremia, ocular effects, prolonged QT interval, withdrawal syndrome following abrupt discontinuation, serotonin syndrome, and activation of suicidal ideation and behavior.  Pt educated on the need to practice safe sex while taking this med. Discussed the need for pt to immediately call the office for any new or worsening symptoms, such as worsening depression; feeling nervous or restless; suicidal thoughts or actions; or other changes changes in mood or behavior, and all other concerns. Pt educated on med compliance and the risks of suddenly stopping this medication or missing doses. Pt verbalized understanding and is agreeable to taking Trazodone. Addressed all questions and concerns.       Follow up: F/u in 1 month      TREATMENT PLAN/GOALS: Continue supportive psychotherapy efforts and medications as indicated. Treatment and medication options discussed during today's visit. Patient ackowledged and verbally consented to continue with current treatment plan and was educated on the importance of  compliance with treatment and follow-up appointments.    MEDICATION ISSUES:  ANDREW reviewed as expected.  Discussed medication options and treatment plan of prescribed medication as well as the risks, benefits, and side effects including potential falls, possible impaired driving and metabolic adversities among others. Patient is agreeable to call the office with any worsening of symptoms or onset of side effects. Patient is agreeable to call 911 or go to the nearest ER should he/she begin having SI/HI. No medication side effects or related complaints today.            This document has been electronically signed by Rosi Jennings PA-C  September 4, 2024 08:35 EDT      Part of this note may be an electronic transcription/translation of spoken language to printed text using the Dragon Dictation System.

## 2024-09-05 RX ORDER — BUSPIRONE HYDROCHLORIDE 10 MG/1
10 TABLET ORAL 2 TIMES DAILY
Qty: 180 TABLET | Refills: 0 | OUTPATIENT
Start: 2024-09-05

## 2024-10-16 ENCOUNTER — TELEPHONE (OUTPATIENT)
Dept: PSYCHIATRY | Facility: CLINIC | Age: 24
End: 2024-10-16
Payer: COMMERCIAL

## 2024-10-16 NOTE — TELEPHONE ENCOUNTER
This needs to go through her regular provider as I don't have a provider-patient relationship with her to attest to what she's reporting. That said, I'll try to contact Rosi to see if I can't put this on her radar so she can complete this prior to tomorrow at 0900.

## 2024-10-16 NOTE — TELEPHONE ENCOUNTER
Patient needs to be giving at least 24 hour notice for any requests with letters. Letter will be done by tomorrow morning.

## 2024-10-16 NOTE — TELEPHONE ENCOUNTER
ATTEMPTED TO CONTACT PT(PATIENT)      NO ANSWER      LEFT VOICEMAIL WITH INSTRUCTIONS TO RETURN CALL TO OFFICE AT [phone]

## 2024-10-16 NOTE — TELEPHONE ENCOUNTER
Patient calling in to request letter excusing her from jury duty due to current circumstances, patient is currently trying to find a job and cannot deal with the added stress of jury duty due to her mental health.    Patient states that letter needs to be completed ASAP as she needs it by tomorrow at 9:00 when she it to report to court.    Routing to covering provider to review

## 2024-10-18 NOTE — TELEPHONE ENCOUNTER
ATTEMPTED TO CONTACT PT(PATIENT)      NO ANSWER      LEFT VOICEMAIL WITH INSTRUCTIONS TO RETURN CALL TO OFFICE AT [phone]

## 2024-10-22 ENCOUNTER — TELEMEDICINE (OUTPATIENT)
Dept: BEHAVIORAL HEALTH | Facility: CLINIC | Age: 24
End: 2024-10-22
Payer: COMMERCIAL

## 2024-10-22 DIAGNOSIS — F33.0 MILD EPISODE OF RECURRENT MAJOR DEPRESSIVE DISORDER: ICD-10-CM

## 2024-10-22 DIAGNOSIS — F51.05 INSOMNIA DUE TO OTHER MENTAL DISORDER: ICD-10-CM

## 2024-10-22 DIAGNOSIS — F41.1 GENERALIZED ANXIETY DISORDER: Primary | ICD-10-CM

## 2024-10-22 DIAGNOSIS — F41.0 PANIC DISORDER: ICD-10-CM

## 2024-10-22 DIAGNOSIS — F99 INSOMNIA DUE TO OTHER MENTAL DISORDER: ICD-10-CM

## 2024-10-22 RX ORDER — VENLAFAXINE HYDROCHLORIDE 75 MG/1
75 CAPSULE, EXTENDED RELEASE ORAL NIGHTLY
Qty: 90 CAPSULE | Refills: 0 | Status: SHIPPED | OUTPATIENT
Start: 2024-10-22

## 2024-10-22 RX ORDER — BUSPIRONE HYDROCHLORIDE 10 MG/1
10 TABLET ORAL 2 TIMES DAILY
Qty: 180 TABLET | Refills: 0 | Status: SHIPPED | OUTPATIENT
Start: 2024-10-22 | End: 2025-01-20

## 2024-10-22 NOTE — PROGRESS NOTES
This provider is located at [address] [facility] [address], [address], [place] [address]. The Patient is seen remotely using [employer]. Patient is being seen via telehealth and confirm that they are in a secure environment for this session. The patient's condition being diagnosed/treated is appropriate for telemedicine. The provider identified herself as well as her credentials.   The patient gave consent to be seen remotely, and when consent is given they understand that the consent allows for patient identifiable information to be sent to a third party as needed.   They may refuse to be seen remotely at any time. The electronic data is encrypted and password protected, and the patient has been advised of the potential risks to privacy not withstanding such measures.    Patient is accepting of and agreeable to appointment.  The appointment consisted of the patient and I only.      Mode of visit: Video  [facility] [address], [place] [address].  Location of patient: Home  Does the patient consent to use a video/audio connection for your medical care today? Yes  The visit included audio and video interaction. No technical issues occurred during this visit.    Chief Complaint:  Depression, anxiety    History of Present Illness: Teri Wood is a 24 y.o. female who presents today for f/u of mood.  Patient has been taking meds as prescribed and tolerating well without any complications. Pt c/o depression that comes and goes, occurs every other week, rates it a 2/10. Pt has had some improvement with motivation and anhedonia, both will vary. Pt will sometimes have hopelessness, no change. Pt denies having any current SI or HI. No SI since last visit. No difficulty sleeping with trazodone as needed. Pt c/o anxiety that comes and goes, depends on the situation, occurs a few times a week, rates it a 5/10. Her anxiety is increased with leaving her house, has been getting better as  she has been doing it more. Her anxiety is worse in social situations and states is situational as well, has improved. Pt had a panic attack last week. No irritability. Pt has been doing therapy weekly at [facility]. Pt states she has been trying to find a job.     Medical Record Review: Reviewed note from 8/9/23, pt diagnosed with STEF, MDD, PTSD. Pt continues to try to get out some. She agrees that getting out more consistently does help her, but she is still very anxious. Treatment objective plan is to process grief over the loss of her health and particularly her toes and leg; adjust to the changes and how they impact her life forward.       PHQ-9 Depression Screening  Little interest or pleasure in doing things?     Feeling down, depressed, or hopeless?     Trouble falling or staying asleep, or sleeping too much?     Feeling tired or having little energy?     Poor appetite or overeating?     Feeling bad about yourself - or that you are a failure or have let yourself or your family down?     Trouble concentrating on things, such as reading the newspaper or watching television?     Moving or speaking so slowly that other people could have noticed? Or the opposite - being so fidgety or restless that you have been moving around a lot more than usual?     Thoughts that you would be better off dead, or of hurting yourself in some way?     PHQ-9 Total Score     If you checked off any problems, how difficult have these problems made it for you to do your work, take care of things at home, or get along with other people?           STEF-7           ROS:  Review of Systems   Constitutional:  Negative for appetite change, diaphoresis and unexpected weight change.   HENT:  Negative for drooling, tinnitus and trouble swallowing.    Eyes:  Negative for visual disturbance.   Respiratory:  Negative for cough, chest tightness and shortness of breath.    Cardiovascular:  Negative for chest pain and  palpitations.   Gastrointestinal:  Negative for abdominal pain, constipation, diarrhea, nausea and vomiting.   Endocrine: Negative for cold intolerance and heat intolerance.   Genitourinary:  Negative for difficulty urinating.   Musculoskeletal:  Negative for arthralgias and myalgias.   Skin:  Negative for rash.   Allergic/Immunologic: Negative for immunocompromised state.   Neurological:  Positive for tremors. Negative for dizziness, seizures and headaches.   Psychiatric/Behavioral:  Positive for agitation and dysphoric mood. Negative for hallucinations, self-injury, sleep disturbance and suicidal ideas. The patient is nervous/anxious.        Problem List:  Patient Active Problem List   Diagnosis    Abnormal gait    Allergic rhinitis    Asthma    Below-knee amputation of right lower extremity    Depression with anxiety    Impaired mobility    Impairment of balance    Raynaud's disease    Systemic lupus       Current Medications:   Current Outpatient Medications   Medication Sig Dispense Refill    busPIRone (BUSPAR) 10 MG tablet Take 1 tablet by mouth 2 (Two) Times a Day for 90 days. 180 tablet 0    venlafaxine XR (Effexor XR) 75 MG 24 hr capsule Take 1 capsule by mouth Every Night. 90 capsule 0    amLODIPine (NORVASC) 5 MG tablet Take 1 tablet by mouth Daily.      aspirin 81 MG chewable tablet Chew 1 tablet Daily.      BIOTIN PO Take  by mouth.      cetirizine (zyrTEC) 10 MG tablet Take 1 tablet by mouth.      Cholecalciferol 25 MCG (1000 UT) tablet dispersible Take  by mouth.      cyanocobalamin (VITAMIN B-12) 1000 MCG tablet Take  by mouth.      hydroxychloroquine (PLAQUENIL) 200 MG tablet Take 1 tablet by mouth Daily.      methylPREDNISolone (MEDROL) 4 MG dose pack Take as directed on package instructions. 21 tablet 0    multivitamin (THERAGRAN) tablet tablet Take  by mouth.      omeprazole (priLOSEC) 20 MG capsule Take 1 capsule by mouth Daily.      Saccharomyces boulardii (Probiotic) 250 MG capsule Take  by  mouth.      traZODone (DESYREL) 50 MG tablet TAKE 1/2 TO 2 TABLETS BY MOUTH EVERY NIGHT AT BEDTIME AS NEEDED FOR SLEEP 60 tablet 0     No current facility-administered medications for this visit.       Discontinued Medications:  Medications Discontinued During This Encounter   Medication Reason    busPIRone (BUSPAR) 10 MG tablet Reorder    venlafaxine XR (Effexor XR) 75 MG 24 hr capsule Reorder                       Allergy:   Allergies   Allergen Reactions    Amoxicillin Rash    Amoxicillin-Pot Clavulanate Rash     Mom unsure - was years ago    Azathioprine Rash    Penicillins Rash        Past Medical History:  Past Medical History:   Diagnosis Date    Anxiety     Hypertension     Lupus        Past Surgical History:  Past Surgical History:   Procedure Laterality Date    AMPUTATION Right     below the knee       Past Psychiatric History:  Began Treatment: 8th grade   Diagnoses: Depression, anxiety, PTSD.   Psychiatrist: Pt last saw a psychiatrist in 8th grade.   Therapist: Pt has been seeing her therapist Jenny Carvalho [facility] since 8th grade.   Admission History: Pt was admitted once in 8th grade.   Medications/Treatment: Paxil (irritable), Prozac, Lexapro (was sick on this med), melatonin, Trazodone, Buspar, Effexor  Self Harm: H/o self harm with cutting, which she last did >5 years ago.   Suicide Attempts: Denies  Postpartum depression: Denies    Family Psychiatric History:   Diagnoses: Her mother has a h/o depression. Her mat grandmother with h/o depression.   Substance use: Her pat grandparents had a h/o EtOH abuse.   Suicide Attempts/Completions: Denies    Family History   Problem Relation Age of Onset    Depression Mother     No Known Problems Father     No Known Problems Sister     No Known Problems Brother     No Known Problems Maternal Aunt     No Known Problems Paternal Aunt     No Known Problems Maternal Uncle     No Known Problems Paternal Uncle     No Known Problems Maternal Grandfather     No Known  "Problems Maternal Grandmother     No Known Problems Paternal Grandfather     No Known Problems Paternal Grandmother     No Known Problems Cousin     No Known Problems Other     ADD / ADHD Neg Hx     Alcohol abuse Neg Hx     Anxiety disorder Neg Hx     Bipolar disorder Neg Hx     Dementia Neg Hx     Drug abuse Neg Hx     OCD Neg Hx     Paranoid behavior Neg Hx     Schizophrenia Neg Hx     Seizures Neg Hx     Self-Injurious Behavior  Neg Hx     Suicide Attempts Neg Hx        Substance Abuse History:   Alcohol use: Pt will drink once every 1-2 weeks.   Nicotine: Denies  Illicit Drug Use: Pt smokes marijuana daily.   Longest Period Sober: Denies  Rehab/AA/NA: Denies    Social History:  Living Situation: Pt lives with boyfriend.   Marital/Relationship History: 5 years with boyfriend. No abuse or trauma.   Children: Denies  Work History/Occupation: Denies  Education: Pt completed high school, some college.    History: Denies  Legal: Denies    Social History     Socioeconomic History    Marital status: Single   Tobacco Use    Smoking status: Former     Types: Cigarettes     Passive exposure: Never    Smokeless tobacco: Never   Vaping Use    Vaping status: Former   Substance and Sexual Activity    Alcohol use: Yes     Comment: occ    Drug use: Yes     Types: Marijuana    Sexual activity: Yes     Partners: Male       Developmental History:   Place of birth: [facility]  Siblings: 2 half siblings.   Childhood: Unremarkable. No h/o abuse or trauma.       Physical Exam:  Physical Exam    Appearance:appears to be of stated age, maintains good eye contact.   Behavior: Appropriate, cooperative. No acute distress.  Motor: No abnormal movements  Speech: Coherent, spontaneous, appropriate with normal rate, volume, rhythm, and tone. Normal reaction time to questions. No hyperverbal or pressured speech.   Mood: \"I'm good\"  Affect: Full range, appropriate, congruent with spontaneous emotional reactivity. Normal intensity. " No emotional blunting.   Thought content: Negative suicidal ideations, negative homicidal ideations. Patient denies any obsession, compulsion, or phobia. No evidence of delusions.  Perceptions: Negative auditory hallucinations, negative visual hallucinations. Pt does not appear to be actively responding to internal stimuli.   Thought process: Logical, goal-directed, coherent, and linear with no evidence of flight of ideas, looseness of associations, thought blocking, circumstantiality, or tangentiality.   Insight/Judgement: Fair/fair  Cognition: Alert and oriented to person, place, and date. Memory intact for recent and remote events. Attention and concentration intact.     I have reexamined the patient and the results are consistent with the previously documented exam. Rosi Jennings PA-C         Vital Signs:   There were no vitals taken for this visit.     Lab Results:   No visits with results within 12 Month(s) from this visit.   Latest known visit with results is:   Admission on 10/21/2023, Discharged on 10/21/2023   Component Date Value Ref Range Status    Rapid Strep A Screen 10/21/2023 Negative  Negative, VALID, INVALID, Not Performed Final    Internal Control 10/21/2023 Passed  Passed Final    Lot Number 10/21/2023 [id number]   Final    Expiration Date 10/21/2023 02/27/2025   Final    Rapid Influenza A Ag 10/21/2023 Negative  Negative Final    Rapid Influenza B Ag 10/21/2023 Negative  Negative Final    Internal Control 10/21/2023 Passed  Passed Final    Lot Number 10/21/2023 [id number]   Final    Expiration Date 10/21/2023 10/27/2024   Final    SARS Antigen 10/21/2023 Detected (A)  Not Detected, Presumptive Negative Final    Internal Control 10/21/2023 Passed  Passed Final    Lot Number 10/21/2023 [id number]   Final    Expiration Date 10/21/2023 10/27/2024   Final       EKG Results:  No orders to display       Imaging Results:  No Images in the past 120 days found..      Assessment & Plan   Diagnoses and all  orders for this visit:    1. Generalized anxiety disorder (Primary)  -     busPIRone (BUSPAR) 10 MG tablet; Take 1 tablet by mouth 2 (Two) Times a Day for 90 days.  Dispense: 180 tablet; Refill: 0  -     venlafaxine XR (Effexor XR) 75 MG 24 hr capsule; Take 1 capsule by mouth Every Night.  Dispense: 90 capsule; Refill: 0    2. Mild episode of recurrent major depressive disorder  -     busPIRone (BUSPAR) 10 MG tablet; Take 1 tablet by mouth 2 (Two) Times a Day for 90 days.  Dispense: 180 tablet; Refill: 0  -     venlafaxine XR (Effexor XR) 75 MG 24 hr capsule; Take 1 capsule by mouth Every Night.  Dispense: 90 capsule; Refill: 0    3. Panic disorder  -     busPIRone (BUSPAR) 10 MG tablet; Take 1 tablet by mouth 2 (Two) Times a Day for 90 days.  Dispense: 180 tablet; Refill: 0  -     venlafaxine XR (Effexor XR) 75 MG 24 hr capsule; Take 1 capsule by mouth Every Night.  Dispense: 90 capsule; Refill: 0    4. Insomnia due to other mental disorder          Patient screened positive for depression based on a PHQ-9 score of  on . Follow-up recommendations include: Prescribed antidepressant medication treatment, Referral to Mental Health specialist, Suicide Risk Assessment performed, and see assessment below .    Presentation seems most consistent with MDD, STEF, panic disorder, insomnia.  Patient declines medication changes and feels like her mood is manageable at this time.  Pt reports taking buspar QD and twice a day as needed. Will continue BuSpar for management depression, anxiety, and overall mood. We will continue effexor for management of depression, anxiety and overall mood. Will continue trazodone for sleep as needed. Pt denies needing a refill.  Instructed patient to contact the office for any new or worsening symptoms or any other concerns.  Continue therapy.  Follow-up in 2 months.  Addressed all questions and concerns.    Visit Diagnoses:    ICD-10-CM ICD-9-CM   1. Generalized anxiety disorder  F41.1 300.02   2.  Mild episode of recurrent major depressive disorder  F33.0 296.31   3. Panic disorder  F41.0 300.01   4. Insomnia due to other mental disorder  F51.05 300.9    F99 327.02           PLAN:  Safety: No acute safety concerns at this time.  Therapy: [facility]  Risk Assessment: Risk of self-harm acutely is moderate to severe.  Risk factors include anxiety disorder, mood disorder, h/o self harm in the past, AODA use, and recent psychosocial stressors (pandemic). Protective factors include no family history, denies access to guns/weapons, no history of suicide attempts in the past, healthcare seeking, no present SI, future orientation, willingness to engage in care.  Risk of self-harm chronically is also moderate to severe, but could be further elevated in the event of treatment noncompliance and/or AODA.  Labs/Diagnostics Ordered:   No orders of the defined types were placed in this encounter.    Medications:   New Medications Ordered This Visit   Medications    busPIRone (BUSPAR) 10 MG tablet     Sig: Take 1 tablet by mouth 2 (Two) Times a Day for 90 days.     Dispense:  180 tablet     Refill:  0    venlafaxine XR (Effexor XR) 75 MG 24 hr capsule     Sig: Take 1 capsule by mouth Every Night.     Dispense:  90 capsule     Refill:  0       Discussed all risks, benefits, alternatives, and side effects of Venlafaxine including but not limited to GI upset, sexual dysfunction, bleeding risk, seizure risk, insomnia, diaphoresis, weight loss, dizziness, drowsiness, asthenia, activation of amy or hypomania, increased fragility fracture risk, hyponatremia, increased BP, hepatotoxicity, ocular effects, withdrawal syndrome following abrupt discontinuation, serotonin syndrome, and activation of suicidal ideation and behavior.  Pt educated on the need to practice safe sex while taking this med. Discussed the need for pt to immediately call the office for any new or  worsening symptoms, such as worsening depression; feeling nervous or restless; suicidal thoughts or actions; or other changes changes in mood or behavior, and all other concerns. Pt educated on med compliance and the risks of suddenly stopping this medication or missing doses. Pt verbalized understanding and is agreeable to taking Venlafaxine. Addressed all questions and concerns.     Discussed all risks, benefits, alternatives, and side effects of Buspirone including but not limited to GI upset, dizziness, sedation, HA, nervousness, restlessness, and serotonin syndrome.  Pt educated on the need to practice safe sex while taking this med. Discussed the need for pt to immediately call the office for any new or worsening symptoms, and all other concerns. Pt educated on med compliance. Pt verbalized understanding and is agreeable to taking Buspirone. Addressed all questions and concerns.     Discussed all risks, benefits, alternatives, and side effects of Trazodone including but not limited to GI upset, sexual dysfunction, dizziness, headache, nervousness, bleeding risk, seizure risk, sedation, headache, activation of amy or hypomania, increased fragility fracture risk, cardiac arrhythmias, priapism, hyponatremia, ocular effects, prolonged QT interval, withdrawal syndrome following abrupt discontinuation, serotonin syndrome, and activation of suicidal ideation and behavior.  Pt educated on the need to practice safe sex while taking this med. Discussed the need for pt to immediately call the office for any new or worsening symptoms, such as worsening depression; feeling nervous or restless; suicidal thoughts or actions; or other changes changes in mood or behavior, and all other concerns. Pt educated on med compliance and the risks of suddenly stopping this medication or missing doses. Pt verbalized understanding and is agreeable to taking Trazodone. Addressed all questions and concerns.       Follow up: F/u in 2  months.      TREATMENT PLAN/GOALS: Continue supportive psychotherapy efforts and medications as indicated. Treatment and medication options discussed during today's visit. Patient ackowledged and verbally consented to continue with current treatment plan and was educated on the importance of compliance with treatment and follow-up appointments.    MEDICATION ISSUES:  ANDREW reviewed as expected.  Discussed medication options and treatment plan of prescribed medication as well as the risks, benefits, and side effects including potential falls, possible impaired driving and metabolic adversities among others. Patient is agreeable to call the office with any worsening of symptoms or onset of side effects. Patient is agreeable to call 911 or go to the nearest ER should he/she begin having SI/HI. No medication side effects or related complaints today.            This document has been electronically signed by Rosi Jennings PA-C  October 22, 2024 13:33 EDT      Part of this note may be an electronic transcription/translation of spoken language to printed text using the Dragon Dictation System.

## 2024-12-20 ENCOUNTER — TELEMEDICINE (OUTPATIENT)
Dept: BEHAVIORAL HEALTH | Facility: CLINIC | Age: 24
End: 2024-12-20
Payer: COMMERCIAL

## 2024-12-20 DIAGNOSIS — F99 INSOMNIA DUE TO OTHER MENTAL DISORDER: ICD-10-CM

## 2024-12-20 DIAGNOSIS — F41.1 GENERALIZED ANXIETY DISORDER: Primary | ICD-10-CM

## 2024-12-20 DIAGNOSIS — F33.0 MILD EPISODE OF RECURRENT MAJOR DEPRESSIVE DISORDER: ICD-10-CM

## 2024-12-20 DIAGNOSIS — F51.05 INSOMNIA DUE TO OTHER MENTAL DISORDER: ICD-10-CM

## 2024-12-20 DIAGNOSIS — F41.0 PANIC DISORDER: ICD-10-CM

## 2024-12-20 RX ORDER — VENLAFAXINE HYDROCHLORIDE 75 MG/1
75 CAPSULE, EXTENDED RELEASE ORAL NIGHTLY
Qty: 90 CAPSULE | Refills: 0 | Status: SHIPPED | OUTPATIENT
Start: 2024-12-20

## 2024-12-20 RX ORDER — TRAZODONE HYDROCHLORIDE 50 MG/1
TABLET, FILM COATED ORAL
Qty: 60 TABLET | Refills: 0 | Status: SHIPPED | OUTPATIENT
Start: 2024-12-20

## 2024-12-20 RX ORDER — BUSPIRONE HYDROCHLORIDE 10 MG/1
10 TABLET ORAL 2 TIMES DAILY
Qty: 180 TABLET | Refills: 0 | Status: SHIPPED | OUTPATIENT
Start: 2024-12-20 | End: 2025-03-20

## 2024-12-20 NOTE — PROGRESS NOTES
Mode of Visit: Video  Location of patient: -HOME-  Location of provider: +[facility]+  You have chosen to receive care through a telehealth visit.  The patient has signed the video visit consent form.  The visit included audio and video interaction. No technical issues occurred during this visit.      Chief Complaint:  Depression, anxiety    History of Present Illness: Teri Wood is a 24 y.o. female who presents today for f/u of mood.  Patient has been taking meds as prescribed and tolerating well without any complications. Pt c/o depression that comes and goes, occurs every other week, rates it a 2/10. Pt has had some anhedonia which she attributes to being sick recently. Pt will sometimes have hopelessness, that has been minimal and attributes to the time of year. Pt denies having any current SI or HI. No SI since last visit. No difficulty sleeping with trazodone as needed. Pt c/o anxiety that comes and goes, depends on the situation, occurs a few times a week, rates it a 4/10. Her anxiety is increased with leaving her house, depends on what she is doing, that varies. Her anxiety is worse in social situations and states is situational as well, no change. No panic attacks since last visit. Pt will have some irritability, is mild and manageable. Pt has been doing therapy weekly at [facility].      I have reviewed/confirmed previously documented HPI with no changes.       Medical Record Review: Reviewed note from 8/9/23, pt diagnosed with STEF, MDD, PTSD. Pt continues to try to get out some. She agrees that getting out more consistently does help her, but she is still very anxious. Treatment objective plan is to process grief over the loss of her health and particularly her toes and leg; adjust to the changes and how they impact her life forward.       PHQ-9 Depression Screening  Little interest or pleasure in doing things? (Patient-Rptd) Over half   Feeling down, depressed, or  hopeless? (Patient-Rptd) Several days   PHQ-2 Total Score (Patient-Rptd) 3   Trouble falling or staying asleep, or sleeping too much? (Patient-Rptd) Several days   Feeling tired or having little energy? (Patient-Rptd) Over half   Poor appetite or overeating? (Patient-Rptd) Over half   Feeling bad about yourself - or that you are a failure or have let yourself or your family down? (Patient-Rptd) Several days   Trouble concentrating on things, such as reading the newspaper or watching television? (Patient-Rptd) Not at all   Moving or speaking so slowly that other people could have noticed? Or the opposite - being so fidgety or restless that you have been moving around a lot more than usual? (Patient-Rptd) Not at all   Thoughts that you would be better off dead, or of hurting yourself in some way? (Patient-Rptd) Not at all   PHQ-9 Total Score (Patient-Rptd) 9   If you checked off any problems, how difficult have these problems made it for you to do your work, take care of things at home, or get along with other people? (Patient-Rptd) Somewhat difficult             STEF-7  Over the last two weeks, how often have you been bothered by the following problems?  Feeling nervous, anxious or on edge: (Patient-Rptd) Several days  Not being able to stop or control worrying: (Patient-Rptd) Several days  Worrying too much about different things: (Patient-Rptd) Not at all  Trouble Relaxing: (Patient-Rptd) Several days  Being so restless that it is hard to sit still: (Patient-Rptd) Not at all  Becoming easily annoyed or irritable: (Patient-Rptd) Not at all  Feeling afraid as if something awful might happen: (Patient-Rptd) Not at all  STEF 7 Total Score: (Patient-Rptd) 3  If you checked any problems, how difficult have these problems made it for you to do your work, take care of things at home, or get along with other people: (Patient-Rptd) Not difficult at all          ROS:  Review of Systems   Constitutional:  Negative for appetite  change, diaphoresis and unexpected weight change.   HENT:  Negative for drooling, tinnitus and trouble swallowing.    Eyes:  Negative for visual disturbance.   Respiratory:  Negative for cough, chest tightness and shortness of breath.    Cardiovascular:  Negative for chest pain and palpitations.   Gastrointestinal:  Negative for abdominal pain, constipation, diarrhea, nausea and vomiting.   Endocrine: Negative for cold intolerance and heat intolerance.   Genitourinary:  Negative for difficulty urinating.   Musculoskeletal:  Negative for arthralgias and myalgias.   Skin:  Negative for rash.   Allergic/Immunologic: Negative for immunocompromised state.   Neurological:  Positive for tremors. Negative for dizziness, seizures and headaches.   Psychiatric/Behavioral:  Positive for agitation and dysphoric mood. Negative for confusion, hallucinations, self-injury, sleep disturbance and suicidal ideas. The patient is nervous/anxious.        Problem List:  Patient Active Problem List   Diagnosis    Abnormal gait    Allergic rhinitis    Asthma    Below-knee amputation of right lower extremity    Depression with anxiety    Impaired mobility    Impairment of balance    Raynaud's disease    Systemic lupus       Current Medications:   Current Outpatient Medications   Medication Sig Dispense Refill    busPIRone (BUSPAR) 10 MG tablet Take 1 tablet by mouth 2 (Two) Times a Day for 90 days. 180 tablet 0    traZODone (DESYREL) 50 MG tablet TAKE 1/2 TO 2 TABLETS BY MOUTH EVERY NIGHT AT BEDTIME AS NEEDED FOR SLEEP 60 tablet 0    venlafaxine XR (Effexor XR) 75 MG 24 hr capsule Take 1 capsule by mouth Every Night. 90 capsule 0    amLODIPine (NORVASC) 5 MG tablet Take 1 tablet by mouth Daily.      aspirin 81 MG chewable tablet Chew 1 tablet Daily.      BIOTIN PO Take  by mouth.      cetirizine (zyrTEC) 10 MG tablet Take 1 tablet by mouth.      Cholecalciferol 25 MCG (1000 UT) tablet dispersible Take  by mouth.      cyanocobalamin (VITAMIN  B-12) 1000 MCG tablet Take  by mouth.      hydroxychloroquine (PLAQUENIL) 200 MG tablet Take 1 tablet by mouth Daily.      methylPREDNISolone (MEDROL) 4 MG dose pack Take as directed on package instructions. 21 tablet 0    multivitamin (THERAGRAN) tablet tablet Take  by mouth.      omeprazole (priLOSEC) 20 MG capsule Take 1 capsule by mouth Daily.      Saccharomyces boulardii (Probiotic) 250 MG capsule Take  by mouth.       No current facility-administered medications for this visit.       Discontinued Medications:  Medications Discontinued During This Encounter   Medication Reason    traZODone (DESYREL) 50 MG tablet Reorder    busPIRone (BUSPAR) 10 MG tablet Reorder    venlafaxine XR (Effexor XR) 75 MG 24 hr capsule Reorder                         Allergy:   Allergies   Allergen Reactions    Amoxicillin Rash    Amoxicillin-Pot Clavulanate Rash     Mom unsure - was years ago    Azathioprine Rash    Penicillins Rash        Past Medical History:  Past Medical History:   Diagnosis Date    Anxiety     Hypertension     Lupus        Past Surgical History:  Past Surgical History:   Procedure Laterality Date    AMPUTATION Right     below the knee       Past Psychiatric History:  Began Treatment: 8th grade   Diagnoses: Depression, anxiety, PTSD.   Psychiatrist: Pt last saw a psychiatrist in 8th grade.   Therapist: Pt has been seeing her therapist Jenny Carvalho [facility] since 8th grade.   Admission History: Pt was admitted once in 8th grade.   Medications/Treatment: Paxil (irritable), Prozac, Lexapro (was sick on this med), melatonin, Trazodone, Buspar, Effexor  Self Harm: H/o self harm with cutting, which she last did >5 years ago.   Suicide Attempts: Denies  Postpartum depression: Denies    Family Psychiatric History:   Diagnoses: Her mother has a h/o depression. Her mat grandmother with h/o depression.   Substance use: Her pat grandparents had a h/o EtOH abuse.   Suicide Attempts/Completions: Denies    Family History   Problem  Relation Age of Onset    Depression Mother     No Known Problems Father     No Known Problems Sister     No Known Problems Brother     No Known Problems Maternal Aunt     No Known Problems Paternal Aunt     No Known Problems Maternal Uncle     No Known Problems Paternal Uncle     No Known Problems Maternal Grandfather     No Known Problems Maternal Grandmother     No Known Problems Paternal Grandfather     No Known Problems Paternal Grandmother     No Known Problems Cousin     No Known Problems Other     ADD / ADHD Neg Hx     Alcohol abuse Neg Hx     Anxiety disorder Neg Hx     Bipolar disorder Neg Hx     Dementia Neg Hx     Drug abuse Neg Hx     OCD Neg Hx     Paranoid behavior Neg Hx     Schizophrenia Neg Hx     Seizures Neg Hx     Self-Injurious Behavior  Neg Hx     Suicide Attempts Neg Hx        Substance Abuse History:   Alcohol use: Pt will drink once every 1-2 weeks.   Nicotine: Denies  Illicit Drug Use: Pt smokes marijuana daily.   Longest Period Sober: Denies  Rehab/AA/NA: Denies    Social History:  Living Situation: Pt lives with boyfriend.   Marital/Relationship History: 5 years with boyfriend. No abuse or trauma.   Children: Denies  Work History/Occupation: Denies  Education: Pt completed high school, some college.    History: Denies  Legal: Denies    Social History     Socioeconomic History    Marital status: Single   Tobacco Use    Smoking status: Former     Types: Cigarettes     Passive exposure: Never    Smokeless tobacco: Never   Vaping Use    Vaping status: Former   Substance and Sexual Activity    Alcohol use: Yes     Comment: occ    Drug use: Yes     Types: Marijuana    Sexual activity: Yes     Partners: Male       Developmental History:   Place of birth: [facility]  Siblings: 2 half siblings.   Childhood: Unremarkable. No h/o abuse or trauma.       Physical Exam:  Physical Exam    Appearance:appears to be of stated age, maintains good eye contact.   Behavior: Appropriate,  "cooperative. No acute distress.  Motor: No abnormal movements  Speech: Coherent, spontaneous, appropriate with normal rate, volume, rhythm, and tone. Normal reaction time to questions. No hyperverbal or pressured speech.   Mood: \"I'm alright\"  Affect: Full range, appropriate, congruent with spontaneous emotional reactivity. Normal intensity. No emotional blunting.   Thought content: Negative suicidal ideations, negative homicidal ideations. Patient denies any obsession, compulsion, or phobia. No evidence of delusions.  Perceptions: Negative auditory hallucinations, negative visual hallucinations. Pt does not appear to be actively responding to internal stimuli.   Thought process: Logical, goal-directed, coherent, and linear with no evidence of flight of ideas, looseness of associations, thought blocking, circumstantiality, or tangentiality.   Insight/Judgement: Fair/fair  Cognition: Alert and oriented to person, place, and date. Memory intact for recent and remote events. Attention and concentration intact.     I have reexamined the patient and the results are consistent with the previously documented exam. Rosi Jennings PA-C           Vital Signs:   There were no vitals taken for this visit.     Lab Results:   No visits with results within 12 Month(s) from this visit.   Latest known visit with results is:   Admission on 10/21/2023, Discharged on 10/21/2023   Component Date Value Ref Range Status    Rapid Strep A Screen 10/21/2023 Negative  Negative, VALID, INVALID, Not Performed Final    Internal Control 10/21/2023 Passed  Passed Final    Lot Number 10/21/2023 [id number]   Final    Expiration Date 10/21/2023 02/27/2025   Final    Rapid Influenza A Ag 10/21/2023 Negative  Negative Final    Rapid Influenza B Ag 10/21/2023 Negative  Negative Final    Internal Control 10/21/2023 Passed  Passed Final    Lot Number 10/21/2023 [id number]   Final    Expiration Date 10/21/2023 10/27/2024   Final    SARS Antigen 10/21/2023 " Detected (A)  Not Detected, Presumptive Negative Final    Internal Control 10/21/2023 Passed  Passed Final    Lot Number 10/21/2023 [id number]   Final    Expiration Date 10/21/2023 10/27/2024   Final       EKG Results:  No orders to display       Imaging Results:  No Images in the past 120 days found..      Assessment & Plan   Diagnoses and all orders for this visit:    1. Generalized anxiety disorder (Primary)  -     busPIRone (BUSPAR) 10 MG tablet; Take 1 tablet by mouth 2 (Two) Times a Day for 90 days.  Dispense: 180 tablet; Refill: 0  -     venlafaxine XR (Effexor XR) 75 MG 24 hr capsule; Take 1 capsule by mouth Every Night.  Dispense: 90 capsule; Refill: 0    2. Mild episode of recurrent major depressive disorder  -     busPIRone (BUSPAR) 10 MG tablet; Take 1 tablet by mouth 2 (Two) Times a Day for 90 days.  Dispense: 180 tablet; Refill: 0  -     venlafaxine XR (Effexor XR) 75 MG 24 hr capsule; Take 1 capsule by mouth Every Night.  Dispense: 90 capsule; Refill: 0    3. Panic disorder  -     busPIRone (BUSPAR) 10 MG tablet; Take 1 tablet by mouth 2 (Two) Times a Day for 90 days.  Dispense: 180 tablet; Refill: 0  -     venlafaxine XR (Effexor XR) 75 MG 24 hr capsule; Take 1 capsule by mouth Every Night.  Dispense: 90 capsule; Refill: 0    4. Insomnia due to other mental disorder  -     traZODone (DESYREL) 50 MG tablet; TAKE 1/2 TO 2 TABLETS BY MOUTH EVERY NIGHT AT BEDTIME AS NEEDED FOR SLEEP  Dispense: 60 tablet; Refill: 0            Patient screened positive for depression based on a PHQ-9 score of  on . Follow-up recommendations include: Prescribed antidepressant medication treatment, Referral to Mental Health specialist, Suicide Risk Assessment performed, and see assessment below .    Presentation seems most consistent with MDD, STEF, panic disorder, insomnia.  Patient declines medication changes.  Pt reports taking buspar QD and twice a day as needed. Will continue BuSpar for management depression, anxiety, and  overall mood. We will continue effexor for management of depression, anxiety and overall mood. Will continue trazodone for sleep as needed. Instructed patient to contact the office for any new or worsening symptoms or any other concerns.  Continue therapy.  Follow-up in 3 months per patient request.  Addressed all questions and concerns.    I have reviewed the assessment and plan and verified the accuracy of it. No changes to assessment and plan since the information was documented. Rosi Jennings PA-C 12/20/24       Visit Diagnoses:    ICD-10-CM ICD-9-CM   1. Generalized anxiety disorder  F41.1 300.02   2. Mild episode of recurrent major depressive disorder  F33.0 296.31   3. Panic disorder  F41.0 300.01   4. Insomnia due to other mental disorder  F51.05 300.9    F99 327.02             PLAN:  Safety: No acute safety concerns at this time.  Therapy: We will refer for psychotherapy to [facility] in counseling  Risk Assessment: Risk of self-harm acutely is moderate to severe.  Risk factors include anxiety disorder, mood disorder, h/o self harm in the past, AODA use, and recent psychosocial stressors (pandemic). Protective factors include no family history, denies access to guns/weapons, no history of suicide attempts in the past, healthcare seeking, no present SI, future orientation, willingness to engage in care.  Risk of self-harm chronically is also moderate to severe, but could be further elevated in the event of treatment noncompliance and/or AODA.  Labs/Diagnostics Ordered:   No orders of the defined types were placed in this encounter.    Medications:   New Medications Ordered This Visit   Medications    traZODone (DESYREL) 50 MG tablet     Sig: TAKE 1/2 TO 2 TABLETS BY MOUTH EVERY NIGHT AT BEDTIME AS NEEDED FOR SLEEP     Dispense:  60 tablet     Refill:  0    busPIRone (BUSPAR) 10 MG tablet     Sig: Take 1 tablet by mouth 2 (Two) Times a Day for 90 days.     Dispense:  180 tablet     Refill:  0     venlafaxine XR (Effexor XR) 75 MG 24 hr capsule     Sig: Take 1 capsule by mouth Every Night.     Dispense:  90 capsule     Refill:  0       Discussed all risks, benefits, alternatives, and side effects of Venlafaxine including but not limited to GI upset, sexual dysfunction, bleeding risk, seizure risk, insomnia, diaphoresis, weight loss, dizziness, drowsiness, asthenia, activation of amy or hypomania, increased fragility fracture risk, hyponatremia, increased BP, hepatotoxicity, ocular effects, withdrawal syndrome following abrupt discontinuation, serotonin syndrome, and activation of suicidal ideation and behavior.  Pt educated on the need to practice safe sex while taking this med. Discussed the need for pt to immediately call the office for any new or worsening symptoms, such as worsening depression; feeling nervous or restless; suicidal thoughts or actions; or other changes changes in mood or behavior, and all other concerns. Pt educated on med compliance and the risks of suddenly stopping this medication or missing doses. Pt verbalized understanding and is agreeable to taking Venlafaxine. Addressed all questions and concerns.     Discussed all risks, benefits, alternatives, and side effects of Buspirone including but not limited to GI upset, dizziness, sedation, HA, nervousness, restlessness, and serotonin syndrome.  Pt educated on the need to practice safe sex while taking this med. Discussed the need for pt to immediately call the office for any new or worsening symptoms, and all other concerns. Pt educated on med compliance. Pt verbalized understanding and is agreeable to taking Buspirone. Addressed all questions and concerns.     Discussed all risks, benefits, alternatives, and side effects of Trazodone including but not limited to GI upset, sexual dysfunction, dizziness, headache, nervousness, bleeding risk, seizure risk, sedation, headache, activation of amy or hypomania, increased fragility  fracture risk, cardiac arrhythmias, priapism, hyponatremia, ocular effects, prolonged QT interval, withdrawal syndrome following abrupt discontinuation, serotonin syndrome, and activation of suicidal ideation and behavior.  Pt educated on the need to practice safe sex while taking this med. Discussed the need for pt to immediately call the office for any new or worsening symptoms, such as worsening depression; feeling nervous or restless; suicidal thoughts or actions; or other changes changes in mood or behavior, and all other concerns. Pt educated on med compliance and the risks of suddenly stopping this medication or missing doses. Pt verbalized understanding and is agreeable to taking Trazodone. Addressed all questions and concerns.       Follow up: F/u in 3 months.      TREATMENT PLAN/GOALS: Continue supportive psychotherapy efforts and medications as indicated. Treatment and medication options discussed during today's visit. Patient ackowledged and verbally consented to continue with current treatment plan and was educated on the importance of compliance with treatment and follow-up appointments.    MEDICATION ISSUES:  ANDREW reviewed as expected.  Discussed medication options and treatment plan of prescribed medication as well as the risks, benefits, and side effects including potential falls, possible impaired driving and metabolic adversities among others. Patient is agreeable to call the office with any worsening of symptoms or onset of side effects. Patient is agreeable to call 911 or go to the nearest ER should he/she begin having SI/HI. No medication side effects or related complaints today.            This document has been electronically signed by Rosi Jennings PA-C  December 20, 2024 09:52 EST      Part of this note may be an electronic transcription/translation of spoken language to printed text using the Dragon Dictation System.

## 2025-03-14 ENCOUNTER — TELEMEDICINE (OUTPATIENT)
Dept: BEHAVIORAL HEALTH | Facility: CLINIC | Age: 25
End: 2025-03-14
Payer: COMMERCIAL

## 2025-03-14 DIAGNOSIS — F41.1 GENERALIZED ANXIETY DISORDER: Primary | ICD-10-CM

## 2025-03-14 DIAGNOSIS — F41.0 PANIC DISORDER: ICD-10-CM

## 2025-03-14 DIAGNOSIS — F51.05 INSOMNIA DUE TO OTHER MENTAL DISORDER: ICD-10-CM

## 2025-03-14 DIAGNOSIS — F99 INSOMNIA DUE TO OTHER MENTAL DISORDER: ICD-10-CM

## 2025-03-14 DIAGNOSIS — F33.0 MILD EPISODE OF RECURRENT MAJOR DEPRESSIVE DISORDER: ICD-10-CM

## 2025-03-14 RX ORDER — BUSPIRONE HYDROCHLORIDE 10 MG/1
10 TABLET ORAL 2 TIMES DAILY
Qty: 180 TABLET | Refills: 0 | Status: SHIPPED | OUTPATIENT
Start: 2025-03-14 | End: 2025-06-12

## 2025-03-14 RX ORDER — VENLAFAXINE HYDROCHLORIDE 75 MG/1
75 CAPSULE, EXTENDED RELEASE ORAL NIGHTLY
Qty: 90 CAPSULE | Refills: 0 | Status: SHIPPED | OUTPATIENT
Start: 2025-03-14

## 2025-03-14 NOTE — PROGRESS NOTES
Mode of Visit: Video  Location of patient: -HOME-  Location of provider: +[facility]+  You have chosen to receive care through a telehealth visit.  The patient has signed the video visit consent form.  The visit included audio and video interaction. No technical issues occurred during this visit.      Chief Complaint:  Depression, anxiety    History of Present Illness: Teri Wood is a 25 y.o. female who presents today for f/u of mood.  Patient has been taking meds as prescribed and tolerating well without any complications. Pt reports taking buspar QD and twice a day as needed. Pt c/o depression that comes and goes, is situational, occurs once a week, rates it a 2/10. Pt states she is interested in playing video games. Pt will sometimes have hopelessness, attributes to having job interviews. Pt denies having any current SI or HI. No SI since last visit. No difficulty sleeping with trazodone as needed. Pt c/o anxiety that comes and goes, depends on the situation, occurs a few times a week, rates it a 6/10. Pt has had more job interviews lately and this has increased her anxiety. Her anxiety is increased with leaving her house, depends on what she is doing, that varies. Her anxiety is worse in social situations and states is situational as well, slightly better. Pt has had one panic attack since last visit. No irritability. Pt has been doing therapy weekly at [facility].      I have reviewed/confirmed previously documented HPI with no changes.     Answers submitted by the patient for this visit:  Anxiety (Submitted on 3/14/2025)  Chief Complaint: Anxiety  Visit: follow-up  Frequency: most days  Severity: moderate  dry mouth: No  excessive worry: Yes  insomnia: No  irritability: No  malaise/fatigue: No  obsessions: Yes  Sleep quality: fair  Hours of sleep per night: 8 Hours  Aggravated by: family issues, social activities, work stress  Medication compliance: %        Medical  Record Review: Reviewed note from 8/9/23, pt diagnosed with STEF, MDD, PTSD. Pt continues to try to get out some. She agrees that getting out more consistently does help her, but she is still very anxious. Treatment objective plan is to process grief over the loss of her health and particularly her toes and leg; adjust to the changes and how they impact her life forward.       PHQ-9 Depression Screening  Little interest or pleasure in doing things? (Patient-Rptd) Several days   Feeling down, depressed, or hopeless? (Patient-Rptd) Several days   PHQ-2 Total Score (Patient-Rptd) 2   Trouble falling or staying asleep, or sleeping too much? (Patient-Rptd) Not at all   Feeling tired or having little energy? (Patient-Rptd) Several days   Poor appetite or overeating? (Patient-Rptd) Not at all   Feeling bad about yourself - or that you are a failure or have let yourself or your family down? (Patient-Rptd) Several days   Trouble concentrating on things, such as reading the newspaper or watching television? (Patient-Rptd) Not at all   Moving or speaking so slowly that other people could have noticed? Or the opposite - being so fidgety or restless that you have been moving around a lot more than usual? (Patient-Rptd) Not at all   Thoughts that you would be better off dead, or of hurting yourself in some way? (Patient-Rptd) Not at all   PHQ-9 Total Score (Patient-Rptd) 4   If you checked off any problems, how difficult have these problems made it for you to do your work, take care of things at home, or get along with other people? (Patient-Rptd) Somewhat difficult             STEF-7  Over the last two weeks, how often have you been bothered by the following problems?  Feeling nervous, anxious or on edge: (Patient-Rptd) More than half the days  Not being able to stop or control worrying: (Patient-Rptd) Not at all  Worrying too much about different things: (Patient-Rptd) Several days  Trouble Relaxing: (Patient-Rptd) Several  days  Being so restless that it is hard to sit still: (Patient-Rptd) Not at all  Becoming easily annoyed or irritable: (Patient-Rptd) Not at all  Feeling afraid as if something awful might happen: (Patient-Rptd) Not at all  STEF 7 Total Score: (Patient-Rptd) 4  If you checked any problems, how difficult have these problems made it for you to do your work, take care of things at home, or get along with other people: (Patient-Rptd) Somewhat difficult          ROS:  Review of Systems   Constitutional:  Negative for appetite change, diaphoresis and unexpected weight change.   HENT:  Negative for drooling, tinnitus and trouble swallowing.    Eyes:  Negative for visual disturbance.   Respiratory:  Negative for cough, chest tightness and shortness of breath.    Cardiovascular:  Negative for chest pain and palpitations.   Gastrointestinal:  Negative for abdominal pain, constipation, diarrhea, nausea and vomiting.   Endocrine: Negative for cold intolerance and heat intolerance.   Genitourinary:  Negative for difficulty urinating.   Musculoskeletal:  Negative for arthralgias and myalgias.   Skin:  Negative for rash.   Allergic/Immunologic: Negative for immunocompromised state.   Neurological:  Positive for tremors. Negative for dizziness, seizures and headaches.   Psychiatric/Behavioral:  Positive for dysphoric mood. Negative for agitation, confusion, hallucinations, self-injury, sleep disturbance and suicidal ideas. The patient is nervous/anxious.        Problem List:  Patient Active Problem List   Diagnosis    Abnormal gait    Allergic rhinitis    Asthma    Below-knee amputation of right lower extremity    Depression with anxiety    Impaired mobility    Impairment of balance    Raynaud's disease    Systemic lupus       Current Medications:   Current Outpatient Medications   Medication Sig Dispense Refill    busPIRone (BUSPAR) 10 MG tablet Take 1 tablet by mouth 2 (Two) Times a Day for 90 days. 180 tablet 0    venlafaxine XR  (Effexor XR) 75 MG 24 hr capsule Take 1 capsule by mouth Every Night. 90 capsule 0    amLODIPine (NORVASC) 5 MG tablet Take 1 tablet by mouth Daily.      aspirin 81 MG chewable tablet Chew 1 tablet Daily.      BIOTIN PO Take  by mouth.      cetirizine (zyrTEC) 10 MG tablet Take 1 tablet by mouth.      Cholecalciferol 25 MCG (1000 UT) tablet dispersible Take  by mouth.      cyanocobalamin (VITAMIN B-12) 1000 MCG tablet Take  by mouth.      hydroxychloroquine (PLAQUENIL) 200 MG tablet Take 1 tablet by mouth Daily.      methylPREDNISolone (MEDROL) 4 MG dose pack Take as directed on package instructions. 21 tablet 0    multivitamin (THERAGRAN) tablet tablet Take  by mouth.      omeprazole (priLOSEC) 20 MG capsule Take 1 capsule by mouth Daily.      Saccharomyces boulardii (Probiotic) 250 MG capsule Take  by mouth.      traZODone (DESYREL) 50 MG tablet TAKE 1/2 TO 2 TABLETS BY MOUTH EVERY NIGHT AT BEDTIME AS NEEDED FOR SLEEP 60 tablet 0     No current facility-administered medications for this visit.       Discontinued Medications:  Medications Discontinued During This Encounter   Medication Reason    busPIRone (BUSPAR) 10 MG tablet Reorder    venlafaxine XR (Effexor XR) 75 MG 24 hr capsule Reorder                           Allergy:   Allergies   Allergen Reactions    Amoxicillin Rash    Amoxicillin-Pot Clavulanate Rash     Mom unsure - was years ago    Azathioprine Rash    Penicillins Rash        Past Medical History:  Past Medical History:   Diagnosis Date    Anxiety     Hypertension     Lupus        Past Surgical History:  Past Surgical History:   Procedure Laterality Date    AMPUTATION Right     below the knee       Past Psychiatric History:  Began Treatment: 8th grade   Diagnoses: Depression, anxiety, PTSD.   Psychiatrist: Pt last saw a psychiatrist in 8th grade.   Therapist: Pt has been seeing her therapist Jenny Carvalho [facility] since 8th grade.   Admission History: Pt was admitted once in 8th grade.    Medications/Treatment: Paxil (irritable), Prozac, Lexapro (was sick on this med), melatonin, Trazodone, Buspar, Effexor  Self Harm: H/o self harm with cutting, which she last did >5 years ago.   Suicide Attempts: Denies  Postpartum depression: Denies    Family Psychiatric History:   Diagnoses: Her mother has a h/o depression. Her mat grandmother with h/o depression.   Substance use: Her pat grandparents had a h/o EtOH abuse.   Suicide Attempts/Completions: Denies    Family History   Problem Relation Age of Onset    Depression Mother     No Known Problems Father     No Known Problems Sister     No Known Problems Brother     No Known Problems Maternal Aunt     No Known Problems Paternal Aunt     No Known Problems Maternal Uncle     No Known Problems Paternal Uncle     No Known Problems Maternal Grandfather     No Known Problems Maternal Grandmother     No Known Problems Paternal Grandfather     No Known Problems Paternal Grandmother     No Known Problems Cousin     No Known Problems Other     ADD / ADHD Neg Hx     Alcohol abuse Neg Hx     Anxiety disorder Neg Hx     Bipolar disorder Neg Hx     Dementia Neg Hx     Drug abuse Neg Hx     OCD Neg Hx     Paranoid behavior Neg Hx     Schizophrenia Neg Hx     Seizures Neg Hx     Self-Injurious Behavior  Neg Hx     Suicide Attempts Neg Hx        Substance Abuse History:   Alcohol use: Pt will drink once every 1-2 weeks.   Nicotine: Denies  Illicit Drug Use: Pt smokes marijuana daily.   Longest Period Sober: Denies  Rehab/AA/NA: Denies    Social History:  Living Situation: Pt lives with boyfriend.   Marital/Relationship History: 5 years with boyfriend. No abuse or trauma.   Children: Denies  Work History/Occupation: Denies  Education: Pt completed high school, some college.    History: Denies  Legal: Denies    Social History     Socioeconomic History    Marital status: Single   Tobacco Use    Smoking status: Former     Types: Cigarettes     Passive exposure: Never     "Smokeless tobacco: Never   Vaping Use    Vaping status: Former   Substance and Sexual Activity    Alcohol use: Yes     Comment: occ    Drug use: Yes     Types: Marijuana    Sexual activity: Yes     Partners: Male       Developmental History:   Place of birth: [facility]  Siblings: 2 half siblings.   Childhood: Unremarkable. No h/o abuse or trauma.       Physical Exam:  Physical Exam    Appearance:appears to be of stated age, maintains good eye contact.   Behavior: Appropriate, cooperative. No acute distress.  Motor: No abnormal movements  Speech: Coherent, spontaneous, appropriate with normal rate, volume, rhythm, and tone. Normal reaction time to questions. No hyperverbal or pressured speech.   Mood: \"I'm alright\"  Affect: Patient appears anxious when discussing job interviews.  Thought content: Negative suicidal ideations, negative homicidal ideations. Patient denies any obsession, compulsion, or phobia. No evidence of delusions.  Perceptions: Negative auditory hallucinations, negative visual hallucinations. Pt does not appear to be actively responding to internal stimuli.   Thought process: Logical, goal-directed, coherent, and linear with no evidence of flight of ideas, looseness of associations, thought blocking, circumstantiality, or tangentiality.   Insight/Judgement: Fair/fair  Cognition: Alert and oriented to person, place, and date. Memory intact for recent and remote events. Attention and concentration intact.     I have reexamined the patient and the results are consistent with the previously documented exam. Rosi Jennings PA-C         Vital Signs:   There were no vitals taken for this visit.     Lab Results:   No visits with results within 12 Month(s) from this visit.   Latest known visit with results is:   Admission on 10/21/2023, Discharged on 10/21/2023   Component Date Value Ref Range Status    Rapid Strep A Screen 10/21/2023 Negative  Negative, VALID, INVALID, Not Performed Final    " Internal Control 10/21/2023 Passed  Passed Final    Lot Number 10/21/2023 [id number]   Final    Expiration Date 10/21/2023 02/27/2025   Final    Rapid Influenza A Ag 10/21/2023 Negative  Negative Final    Rapid Influenza B Ag 10/21/2023 Negative  Negative Final    Internal Control 10/21/2023 Passed  Passed Final    Lot Number 10/21/2023 [id number]   Final    Expiration Date 10/21/2023 10/27/2024   Final    SARS Antigen 10/21/2023 Detected (A)  Not Detected, Presumptive Negative Final    Internal Control 10/21/2023 Passed  Passed Final    Lot Number 10/21/2023 [id number]   Final    Expiration Date 10/21/2023 10/27/2024   Final       EKG Results:  No orders to display       Imaging Results:  No Images in the past 120 days found..      Assessment & Plan   Diagnoses and all orders for this visit:    1. Generalized anxiety disorder (Primary)  -     busPIRone (BUSPAR) 10 MG tablet; Take 1 tablet by mouth 2 (Two) Times a Day for 90 days.  Dispense: 180 tablet; Refill: 0  -     venlafaxine XR (Effexor XR) 75 MG 24 hr capsule; Take 1 capsule by mouth Every Night.  Dispense: 90 capsule; Refill: 0    2. Mild episode of recurrent major depressive disorder  -     busPIRone (BUSPAR) 10 MG tablet; Take 1 tablet by mouth 2 (Two) Times a Day for 90 days.  Dispense: 180 tablet; Refill: 0  -     venlafaxine XR (Effexor XR) 75 MG 24 hr capsule; Take 1 capsule by mouth Every Night.  Dispense: 90 capsule; Refill: 0    3. Panic disorder  -     busPIRone (BUSPAR) 10 MG tablet; Take 1 tablet by mouth 2 (Two) Times a Day for 90 days.  Dispense: 180 tablet; Refill: 0  -     venlafaxine XR (Effexor XR) 75 MG 24 hr capsule; Take 1 capsule by mouth Every Night.  Dispense: 90 capsule; Refill: 0    4. Insomnia due to other mental disorder          Patient screened positive for depression based on a PHQ-9 score of  on . Follow-up recommendations include: Prescribed antidepressant medication treatment, Referral to Mental Health specialist, Suicide Risk  Assessment performed, and see assessment below .    Dx: MDD, STEF, panic disorder, insomnia.  Patient declines medication changes.  Pt reports taking buspar QD and twice a day as needed. Will continue BuSpar for management depression, anxiety, and overall mood. We will continue effexor for management of depression, anxiety and overall mood. Will continue trazodone for sleep as needed.  Patient denies needing a refill.  Instructed patient to contact the office for any new or worsening symptoms or any other concerns.  Continue therapy.  Follow-up in 3 months per patient request.  Addressed all questions and concerns.    I have reviewed the assessment and plan and verified the accuracy of it. No changes to assessment and plan since the information was documented. Rosi Jennings PA-C 03/14/25         Visit Diagnoses:    ICD-10-CM ICD-9-CM   1. Generalized anxiety disorder  F41.1 300.02   2. Mild episode of recurrent major depressive disorder  F33.0 296.31   3. Panic disorder  F41.0 300.01   4. Insomnia due to other mental disorder  F51.05 300.9    F99 327.02           PLAN:  Safety: No acute safety concerns at this time.  Therapy: We will refer for psychotherapy to [facility] in counseling  Risk Assessment: Risk of self-harm acutely is moderate to severe.  Risk factors include anxiety disorder, mood disorder, h/o self harm in the past, AODA use, and recent psychosocial stressors (pandemic). Protective factors include no family history, denies access to guns/weapons, no history of suicide attempts in the past, healthcare seeking, no present SI, future orientation, willingness to engage in care.  Risk of self-harm chronically is also moderate to severe, but could be further elevated in the event of treatment noncompliance and/or AODA.  Labs/Diagnostics Ordered:   No orders of the defined types were placed in this encounter.    Medications:   New Medications Ordered This Visit   Medications    busPIRone  (BUSPAR) 10 MG tablet     Sig: Take 1 tablet by mouth 2 (Two) Times a Day for 90 days.     Dispense:  180 tablet     Refill:  0    venlafaxine XR (Effexor XR) 75 MG 24 hr capsule     Sig: Take 1 capsule by mouth Every Night.     Dispense:  90 capsule     Refill:  0       Discussed all risks, benefits, alternatives, and side effects of Venlafaxine including but not limited to GI upset, sexual dysfunction, bleeding risk, seizure risk, insomnia, diaphoresis, weight loss, dizziness, drowsiness, asthenia, activation of amy or hypomania, increased fragility fracture risk, hyponatremia, increased BP, hepatotoxicity, ocular effects, withdrawal syndrome following abrupt discontinuation, serotonin syndrome, and activation of suicidal ideation and behavior.  Pt educated on the need to practice safe sex while taking this med. Discussed the need for pt to immediately call the office for any new or worsening symptoms, such as worsening depression; feeling nervous or restless; suicidal thoughts or actions; or other changes changes in mood or behavior, and all other concerns. Pt educated on med compliance and the risks of suddenly stopping this medication or missing doses. Pt verbalized understanding and is agreeable to taking Venlafaxine. Addressed all questions and concerns.     Discussed all risks, benefits, alternatives, and side effects of Buspirone including but not limited to GI upset, dizziness, sedation, HA, nervousness, restlessness, and serotonin syndrome.  Pt educated on the need to practice safe sex while taking this med. Discussed the need for pt to immediately call the office for any new or worsening symptoms, and all other concerns. Pt educated on med compliance. Pt verbalized understanding and is agreeable to taking Buspirone. Addressed all questions and concerns.     Discussed all risks, benefits, alternatives, and side effects of Trazodone including but not limited to GI upset, sexual dysfunction, dizziness,  headache, nervousness, bleeding risk, seizure risk, sedation, headache, activation of amy or hypomania, increased fragility fracture risk, cardiac arrhythmias, priapism, hyponatremia, ocular effects, prolonged QT interval, withdrawal syndrome following abrupt discontinuation, serotonin syndrome, and activation of suicidal ideation and behavior.  Pt educated on the need to practice safe sex while taking this med. Discussed the need for pt to immediately call the office for any new or worsening symptoms, such as worsening depression; feeling nervous or restless; suicidal thoughts or actions; or other changes changes in mood or behavior, and all other concerns. Pt educated on med compliance and the risks of suddenly stopping this medication or missing doses. Pt verbalized understanding and is agreeable to taking Trazodone. Addressed all questions and concerns.       Follow up: F/u in 3 months.      TREATMENT PLAN/GOALS: Continue supportive psychotherapy efforts and medications as indicated. Treatment and medication options discussed during today's visit. Patient ackowledged and verbally consented to continue with current treatment plan and was educated on the importance of compliance with treatment and follow-up appointments.    MEDICATION ISSUES:  ANDREW reviewed as expected.  Discussed medication options and treatment plan of prescribed medication as well as the risks, benefits, and side effects including potential falls, possible impaired driving and metabolic adversities among others. Patient is agreeable to call the office with any worsening of symptoms or onset of side effects. Patient is agreeable to call 911 or go to the nearest ER should he/she begin having SI/HI. No medication side effects or related complaints today.            This document has been electronically signed by Rosi Jennings PA-C  March 14, 2025 08:33 EDT      Part of this note may be an electronic transcription/translation of spoken  language to printed text using the Dragon Dictation System.

## 2025-06-10 ENCOUNTER — TELEMEDICINE (OUTPATIENT)
Dept: BEHAVIORAL HEALTH | Facility: CLINIC | Age: 25
End: 2025-06-10
Payer: COMMERCIAL

## 2025-06-10 DIAGNOSIS — F41.0 PANIC DISORDER: ICD-10-CM

## 2025-06-10 DIAGNOSIS — F33.0 MILD EPISODE OF RECURRENT MAJOR DEPRESSIVE DISORDER: ICD-10-CM

## 2025-06-10 DIAGNOSIS — F41.1 GENERALIZED ANXIETY DISORDER: Primary | ICD-10-CM

## 2025-06-10 RX ORDER — BUSPIRONE HYDROCHLORIDE 10 MG/1
10 TABLET ORAL 2 TIMES DAILY
Qty: 180 TABLET | Refills: 0 | Status: SHIPPED | OUTPATIENT
Start: 2025-06-10 | End: 2025-09-08

## 2025-06-10 NOTE — PROGRESS NOTES
Mode of Visit: Video  Location of patient: -HOME-  Location of provider: +[facility]+  You have chosen to receive care through a telehealth visit.  The patient has signed the video visit consent form.  The visit included audio and video interaction. No technical issues occurred during this visit.      Chief Complaint:  Depression, anxiety    History of Present Illness: Teri Wood is a 25 y.o. female who presents today for f/u of mood.  Patient states she accidentally stopped taking Effexor in April. Pt reports taking buspar QD and twice a day as needed. Pt started working at the end of April. Pt c/o depression that comes and goes, is situational, occurs once a week, rates it a 2/10. Pt states she is exhausted and has no energy at the end of the day. Pt is tying to balance work and home. Pt states she is interested in playing video games. Pt will sometimes have hopelessness. Pt denies having any current SI or HI. No SI since last visit. No difficulty sleeping. Pt has not needed to take Trazodone. Pt c/o anxiety that comes and goes, occurs every day she works, rates it a 7/10. Her anxiety is increased with leaving her house, depends on what she is doing, that varies. Her anxiety is worse in social situations. No panic attacks. She has had some irritability. Pt reports appetite fluctuates. Pt has been doing therapy at [facility], has missed a few weeks due to scheduling.     I have reviewed/confirmed previously documented HPI with no changes.     Answers submitted by the patient for this visit:  Depression (Submitted on 6/10/2025)  Chief Complaint: Depression  Visit: follow-up  Frequency: most days  Severity: moderate  excessive worry: Yes  insomnia: No  irritability: Yes  malaise/fatigue: Yes  obsessions: No  hypersomnia: No  difficulty controlling mood: Yes  Medication compliance: %  Aggravated by: social activities, work stress        Medical Record Review: Reviewed note  from 8/9/23, pt diagnosed with STEF, MDD, PTSD. Pt continues to try to get out some. She agrees that getting out more consistently does help her, but she is still very anxious. Treatment objective plan is to process grief over the loss of her health and particularly her toes and leg; adjust to the changes and how they impact her life forward.       PHQ-9 Depression Screening  Little interest or pleasure in doing things? (Patient-Rptd) Over half   Feeling down, depressed, or hopeless? (Patient-Rptd) Several days   PHQ-2 Total Score (Patient-Rptd) 3   Trouble falling or staying asleep, or sleeping too much? (Patient-Rptd) Not at all   Feeling tired or having little energy? (Patient-Rptd) Almost all   Poor appetite or overeating? (Patient-Rptd) Over half   Feeling bad about yourself - or that you are a failure or have let yourself or your family down? (Patient-Rptd) Several days   Trouble concentrating on things, such as reading the newspaper or watching television? (Patient-Rptd) Several days   Moving or speaking so slowly that other people could have noticed? Or the opposite - being so fidgety or restless that you have been moving around a lot more than usual? (Patient-Rptd) Not at all   Thoughts that you would be better off dead, or of hurting yourself in some way? (Patient-Rptd) Not at all   PHQ-9 Total Score (Patient-Rptd) 10   If you checked off any problems, how difficult have these problems made it for you to do your work, take care of things at home, or get along with other people? (Patient-Rptd) Somewhat difficult             STEF-7  Over the last two weeks, how often have you been bothered by the following problems?  Feeling nervous, anxious or on edge: (Patient-Rptd) More than half the days  Not being able to stop or control worrying: (Patient-Rptd) Not at all  Worrying too much about different things: (Patient-Rptd) More than half the days  Trouble Relaxing: (Patient-Rptd) Several days  Being so restless  that it is hard to sit still: (Patient-Rptd) Not at all  Becoming easily annoyed or irritable: (Patient-Rptd) Several days  Feeling afraid as if something awful might happen: (Patient-Rptd) Several days  STEF 7 Total Score: (Patient-Rptd) 7  If you checked any problems, how difficult have these problems made it for you to do your work, take care of things at home, or get along with other people: (Patient-Rptd) Somewhat difficult          ROS:  Review of Systems   Constitutional:  Positive for appetite change. Negative for diaphoresis and unexpected weight change.   HENT:  Negative for drooling, tinnitus and trouble swallowing.    Eyes:  Negative for visual disturbance.   Respiratory:  Negative for cough, chest tightness and shortness of breath.    Cardiovascular:  Negative for chest pain and palpitations.   Gastrointestinal:  Positive for nausea and vomiting. Negative for abdominal pain, constipation and diarrhea.   Endocrine: Negative for cold intolerance and heat intolerance.   Genitourinary:  Negative for difficulty urinating.   Musculoskeletal:  Negative for arthralgias and myalgias.   Skin:  Negative for rash.   Allergic/Immunologic: Negative for immunocompromised state.   Neurological:  Positive for tremors. Negative for dizziness, seizures and headaches.   Psychiatric/Behavioral:  Positive for agitation and dysphoric mood. Negative for confusion, hallucinations, self-injury, sleep disturbance and suicidal ideas. The patient is nervous/anxious.        Problem List:  Patient Active Problem List   Diagnosis    Abnormal gait    Allergic rhinitis    Asthma    Below-knee amputation of right lower extremity    Depression with anxiety    Impaired mobility    Impairment of balance    Raynaud's disease    Systemic lupus       Current Medications:   Current Outpatient Medications   Medication Sig Dispense Refill    busPIRone (BUSPAR) 10 MG tablet Take 1 tablet by mouth 2 (Two) Times a Day for 90 days. 180 tablet 0     amLODIPine (NORVASC) 5 MG tablet Take 1 tablet by mouth Daily.      aspirin 81 MG chewable tablet Chew 1 tablet Daily.      BIOTIN PO Take  by mouth.      cetirizine (zyrTEC) 10 MG tablet Take 1 tablet by mouth.      Cholecalciferol 25 MCG (1000 UT) tablet dispersible Take  by mouth.      cyanocobalamin (VITAMIN B-12) 1000 MCG tablet Take  by mouth.      hydroxychloroquine (PLAQUENIL) 200 MG tablet Take 1 tablet by mouth Daily.      methylPREDNISolone (MEDROL) 4 MG dose pack Take as directed on package instructions. 21 tablet 0    multivitamin (THERAGRAN) tablet tablet Take  by mouth.      omeprazole (priLOSEC) 20 MG capsule Take 1 capsule by mouth Daily.      Saccharomyces boulardii (Probiotic) 250 MG capsule Take  by mouth.       No current facility-administered medications for this visit.       Discontinued Medications:  Medications Discontinued During This Encounter   Medication Reason    venlafaxine XR (Effexor XR) 75 MG 24 hr capsule     traZODone (DESYREL) 50 MG tablet     busPIRone (BUSPAR) 10 MG tablet Reorder       Allergy:   Allergies   Allergen Reactions    Amoxicillin Rash    Amoxicillin-Pot Clavulanate Rash     Mom unsure - was years ago    Azathioprine Rash    Penicillins Rash        Past Medical History:  Past Medical History:   Diagnosis Date    Anxiety     Hypertension     Lupus        Past Surgical History:  Past Surgical History:   Procedure Laterality Date    AMPUTATION Right     below the knee       Past Psychiatric History:  Began Treatment: 8th grade   Diagnoses: Depression, anxiety, PTSD.   Psychiatrist: Pt last saw a psychiatrist in 8th grade.   Therapist: Pt has been seeing her therapist [facility] since 8th grade.   Admission History: Pt was admitted once in 8th grade.   Medications/Treatment: Paxil (irritable), Prozac, Lexapro (was sick on this med), melatonin, Trazodone, Buspar, Effexor  Self Harm: H/o self harm with cutting, which she last did >5 years ago.   Suicide Attempts:  Denies  Postpartum depression: Denies    Family Psychiatric History:   Diagnoses: Her mother has a h/o depression. Her mat grandmother with h/o depression.   Substance use: Her pat grandparents had a h/o EtOH abuse.   Suicide Attempts/Completions: Denies    Family History   Problem Relation Age of Onset    Depression Mother     No Known Problems Father     No Known Problems Sister     No Known Problems Brother     No Known Problems Maternal Aunt     No Known Problems Paternal Aunt     No Known Problems Maternal Uncle     No Known Problems Paternal Uncle     No Known Problems Maternal Grandfather     No Known Problems Maternal Grandmother     No Known Problems Paternal Grandfather     No Known Problems Paternal Grandmother     No Known Problems Cousin     No Known Problems Other     ADD / ADHD Neg Hx     Alcohol abuse Neg Hx     Anxiety disorder Neg Hx     Bipolar disorder Neg Hx     Dementia Neg Hx     Drug abuse Neg Hx     OCD Neg Hx     Paranoid behavior Neg Hx     Schizophrenia Neg Hx     Seizures Neg Hx     Self-Injurious Behavior  Neg Hx     Suicide Attempts Neg Hx        Substance Abuse History:   Alcohol use: Pt will drink once every 1-2 weeks.   Nicotine: Denies  Illicit Drug Use: Pt smokes marijuana daily.   Longest Period Sober: Denies  Rehab/AA/NA: Denies    Social History:  Living Situation: Pt lives with boyfriend.   Marital/Relationship History: 5 years with boyfriend. No abuse or trauma.   Children: Denies  Work History/Occupation: Denies  Education: Pt completed high school, some college.    History: Denies  Legal: Denies    Social History     Socioeconomic History    Marital status: Single   Tobacco Use    Smoking status: Former     Types: Cigarettes     Passive exposure: Never    Smokeless tobacco: Never   Vaping Use    Vaping status: Former   Substance and Sexual Activity    Alcohol use: Yes     Comment: occ    Drug use: Yes     Types: Marijuana    Sexual activity: Yes     Partners: Male  "      Developmental History:   Place of birth: [facility]  Siblings: 2 half siblings.   Childhood: Unremarkable. No h/o abuse or trauma.       Physical Exam:  Physical Exam    Appearance:appears to be of stated age, maintains good eye contact.   Behavior: Appropriate, cooperative. No acute distress.  Motor: No abnormal movements  Speech: Coherent, spontaneous, appropriate with normal rate, volume, rhythm, and tone. Normal reaction time to questions. No hyperverbal or pressured speech.   Mood: \"I'm alright\"  Affect: Patient appears slightly anxious when discussing stressors.  Thought content: Negative suicidal ideations, negative homicidal ideations. Patient denies any obsession, compulsion, or phobia. No evidence of delusions.  Perceptions: Negative auditory hallucinations, negative visual hallucinations. Pt does not appear to be actively responding to internal stimuli.   Thought process: Logical, goal-directed, coherent, and linear with no evidence of flight of ideas, looseness of associations, thought blocking, circumstantiality, or tangentiality.   Insight/Judgement: Fair/fair  Cognition: Alert and oriented to person, place, and date. Memory intact for recent and remote events. Attention and concentration intact.     I have reexamined the patient and the results are consistent with the previously documented exam. Rosi Jennings PA-C             Vital Signs:   There were no vitals taken for this visit.     Lab Results:   No visits with results within 12 Month(s) from this visit.   Latest known visit with results is:   Admission on 10/21/2023, Discharged on 10/21/2023   Component Date Value Ref Range Status    Rapid Strep A Screen 10/21/2023 Negative  Negative, VALID, INVALID, Not Performed Final    Internal Control 10/21/2023 Passed  Passed Final    Lot Number 10/21/2023 [id number]   Final    Expiration Date 10/21/2023 02/27/2025   Final    Rapid Influenza A Ag 10/21/2023 Negative  Negative Final    Rapid " Influenza B Ag 10/21/2023 Negative  Negative Final    Internal Control 10/21/2023 Passed  Passed Final    Lot Number 10/21/2023 [id number]   Final    Expiration Date 10/21/2023 10/27/2024   Final    SARS Antigen 10/21/2023 Detected (A)  Not Detected, Presumptive Negative Final    Internal Control 10/21/2023 Passed  Passed Final    Lot Number 10/21/2023 [id number]   Final    Expiration Date 10/21/2023 10/27/2024   Final       EKG Results:  No orders to display       Imaging Results:  No Images in the past 120 days found..      Assessment & Plan   Diagnoses and all orders for this visit:    1. Generalized anxiety disorder (Primary)  -     busPIRone (BUSPAR) 10 MG tablet; Take 1 tablet by mouth 2 (Two) Times a Day for 90 days.  Dispense: 180 tablet; Refill: 0    2. Mild episode of recurrent major depressive disorder  -     busPIRone (BUSPAR) 10 MG tablet; Take 1 tablet by mouth 2 (Two) Times a Day for 90 days.  Dispense: 180 tablet; Refill: 0    3. Panic disorder  -     busPIRone (BUSPAR) 10 MG tablet; Take 1 tablet by mouth 2 (Two) Times a Day for 90 days.  Dispense: 180 tablet; Refill: 0            Patient screened positive for depression based on a PHQ-9 score of  on . Follow-up recommendations include: Prescribed antidepressant medication treatment, Referral to Mental Health specialist, Suicide Risk Assessment performed, and see assessment below.    Dx: MDD, STEF, panic disorder.  Patient would like to discontinue Effexor and also trazodone as she does not take this medication.  Discussed increasing BuSpar or starting a new antidepressant.  Patient declines medication changes at this time. Will continue BuSpar for management depression, anxiety, and overall mood.  Instructed patient to contact the office for any new or worsening symptoms or any other concerns.  Continue therapy.  Follow-up in 3 months per patient request.  Addressed all questions and concerns.    I have reviewed the assessment and plan and verified  the accuracy of it. No changes to assessment and plan since the information was documented. Rosi Jennings PA-C 06/10/25           Visit Diagnoses:    ICD-10-CM ICD-9-CM   1. Generalized anxiety disorder  F41.1 300.02   2. Mild episode of recurrent major depressive disorder  F33.0 296.31   3. Panic disorder  F41.0 300.01             PLAN:  Safety: No acute safety concerns at this time.  Therapy: We will refer for psychotherapy to [facility]  Risk Assessment: Risk of self-harm acutely is moderate to severe.  Risk factors include anxiety disorder, mood disorder, h/o self harm in the past, AODA use, and recent psychosocial stressors (pandemic). Protective factors include no family history, denies access to guns/weapons, no history of suicide attempts in the past, healthcare seeking, no present SI, future orientation, willingness to engage in care.  Risk of self-harm chronically is also moderate to severe, but could be further elevated in the event of treatment noncompliance and/or AODA.  Labs/Diagnostics Ordered:   No orders of the defined types were placed in this encounter.    Medications:   New Medications Ordered This Visit   Medications    busPIRone (BUSPAR) 10 MG tablet     Sig: Take 1 tablet by mouth 2 (Two) Times a Day for 90 days.     Dispense:  180 tablet     Refill:  0       Discussed all risks, benefits, alternatives, and side effects of Buspirone including but not limited to GI upset, dizziness, sedation, HA, nervousness, restlessness, and serotonin syndrome.  Pt educated on the need to practice safe sex while taking this med. Discussed the need for pt to immediately call the office for any new or worsening symptoms, and all other concerns. Pt educated on med compliance. Pt verbalized understanding and is agreeable to taking Buspirone. Addressed all questions and concerns.         Follow up: F/u in 3 months.      TREATMENT PLAN/GOALS: Continue supportive psychotherapy efforts and  medications as indicated. Treatment and medication options discussed during today's visit. Patient ackowledged and verbally consented to continue with current treatment plan and was educated on the importance of compliance with treatment and follow-up appointments.    MEDICATION ISSUES:  ANDREW reviewed as expected.  Discussed medication options and treatment plan of prescribed medication as well as the risks, benefits, and side effects including potential falls, possible impaired driving and metabolic adversities among others. Patient is agreeable to call the office with any worsening of symptoms or onset of side effects. Patient is agreeable to call 911 or go to the nearest ER should he/she begin having SI/HI. No medication side effects or related complaints today.            This document has been electronically signed by Rosi Jennings PA-C  Naomy 10, 2025 08:12 EDT      Part of this note may be an electronic transcription/translation of spoken language to printed text using the Dragon Dictation System.